# Patient Record
Sex: FEMALE | Race: BLACK OR AFRICAN AMERICAN | NOT HISPANIC OR LATINO | Employment: FULL TIME | URBAN - METROPOLITAN AREA
[De-identification: names, ages, dates, MRNs, and addresses within clinical notes are randomized per-mention and may not be internally consistent; named-entity substitution may affect disease eponyms.]

---

## 2021-06-02 ENCOUNTER — HISTORICAL (OUTPATIENT)
Dept: ADMINISTRATIVE | Facility: HOSPITAL | Age: 26
End: 2021-06-02

## 2021-06-02 LAB
ABS NEUT (OLG): 4.27 X10(3)/MCL (ref 2.1–9.2)
ALBUMIN SERPL-MCNC: 4.1 GM/DL (ref 3.5–5)
ALBUMIN/GLOB SERPL: 1.3 RATIO (ref 1.1–2)
ALP SERPL-CCNC: 61 UNIT/L (ref 40–150)
ALT SERPL-CCNC: 18 UNIT/L (ref 0–55)
APPEARANCE, UA: CLEAR
AST SERPL-CCNC: 15 UNIT/L (ref 5–34)
BACTERIA SPEC CULT: NORMAL /HPF
BASOPHILS # BLD AUTO: 0 X10(3)/MCL (ref 0–0.2)
BASOPHILS NFR BLD AUTO: 0 %
BILIRUB SERPL-MCNC: 0.5 MG/DL
BILIRUB UR QL STRIP: NEGATIVE
BILIRUBIN DIRECT+TOT PNL SERPL-MCNC: 0.2 MG/DL (ref 0–0.5)
BILIRUBIN DIRECT+TOT PNL SERPL-MCNC: 0.3 MG/DL (ref 0–0.8)
BUN SERPL-MCNC: 10.3 MG/DL (ref 7–18.7)
CALCIUM SERPL-MCNC: 9.7 MG/DL (ref 8.4–10.2)
CHLORIDE SERPL-SCNC: 103 MMOL/L (ref 98–107)
CHOLEST SERPL-MCNC: 185 MG/DL
CHOLEST/HDLC SERPL: 4 {RATIO} (ref 0–5)
CO2 SERPL-SCNC: 24 MMOL/L (ref 22–29)
COLOR UR: YELLOW
CREAT SERPL-MCNC: 0.76 MG/DL (ref 0.55–1.02)
EOSINOPHIL # BLD AUTO: 0.1 X10(3)/MCL (ref 0–0.9)
EOSINOPHIL NFR BLD AUTO: 2 %
ERYTHROCYTE [DISTWIDTH] IN BLOOD BY AUTOMATED COUNT: 13.3 % (ref 11.5–17)
EST. AVERAGE GLUCOSE BLD GHB EST-MCNC: 93.9 MG/DL
GLOBULIN SER-MCNC: 3.2 GM/DL (ref 2.4–3.5)
GLUCOSE (UA): NEGATIVE
GLUCOSE SERPL-MCNC: 106 MG/DL (ref 74–100)
HBA1C MFR BLD: 4.9 %
HCT VFR BLD AUTO: 41.2 % (ref 37–47)
HDLC SERPL-MCNC: 48 MG/DL (ref 35–60)
HGB BLD-MCNC: 12.6 GM/DL (ref 12–16)
HGB UR QL STRIP: NEGATIVE
KETONES UR QL STRIP: NEGATIVE
LDLC SERPL CALC-MCNC: 114 MG/DL (ref 50–140)
LEUKOCYTE ESTERASE UR QL STRIP: NEGATIVE
LYMPHOCYTES # BLD AUTO: 1.3 X10(3)/MCL (ref 0.6–4.6)
LYMPHOCYTES NFR BLD AUTO: 22 %
MCH RBC QN AUTO: 26.7 PG (ref 27–31)
MCHC RBC AUTO-ENTMCNC: 30.6 GM/DL (ref 33–36)
MCV RBC AUTO: 87.3 FL (ref 80–94)
MONOCYTES # BLD AUTO: 0.3 X10(3)/MCL (ref 0.1–1.3)
MONOCYTES NFR BLD AUTO: 5 %
NEUTROPHILS # BLD AUTO: 4.27 X10(3)/MCL (ref 2.1–9.2)
NEUTROPHILS NFR BLD AUTO: 71 %
NITRITE UR QL STRIP: NEGATIVE
PH UR STRIP: 6 [PH] (ref 5–9)
PLATELET # BLD AUTO: 372 X10(3)/MCL (ref 130–400)
PMV BLD AUTO: 10.7 FL (ref 9.4–12.4)
POTASSIUM SERPL-SCNC: 4.1 MMOL/L (ref 3.5–5.1)
PROT SERPL-MCNC: 7.3 GM/DL (ref 6.4–8.3)
PROT UR QL STRIP: NEGATIVE
RBC # BLD AUTO: 4.72 X10(6)/MCL (ref 4.2–5.4)
RBC #/AREA URNS HPF: NORMAL /[HPF]
SODIUM SERPL-SCNC: 137 MMOL/L (ref 136–145)
SP GR UR STRIP: >=1.04 (ref 1–1.03)
SQUAMOUS EPITHELIAL, UA: NORMAL /HPF (ref 0–4)
TRIGL SERPL-MCNC: 114 MG/DL (ref 37–140)
TSH SERPL-ACNC: 2.67 UIU/ML (ref 0.35–4.94)
UROBILINOGEN UR STRIP-ACNC: 0.2
VLDLC SERPL CALC-MCNC: 23 MG/DL
WBC # SPEC AUTO: 6 X10(3)/MCL (ref 4.5–11.5)
WBC #/AREA URNS HPF: NORMAL /[HPF]

## 2021-06-17 ENCOUNTER — HISTORICAL (OUTPATIENT)
Dept: ADMINISTRATIVE | Facility: HOSPITAL | Age: 26
End: 2021-06-17

## 2022-04-11 ENCOUNTER — HISTORICAL (OUTPATIENT)
Dept: ADMINISTRATIVE | Facility: HOSPITAL | Age: 27
End: 2022-04-11

## 2022-04-27 VITALS
BODY MASS INDEX: 40.65 KG/M2 | OXYGEN SATURATION: 97 % | WEIGHT: 244 LBS | DIASTOLIC BLOOD PRESSURE: 74 MMHG | SYSTOLIC BLOOD PRESSURE: 110 MMHG | HEIGHT: 65 IN

## 2022-05-24 ENCOUNTER — TELEPHONE (OUTPATIENT)
Dept: FAMILY MEDICINE | Facility: CLINIC | Age: 27
End: 2022-05-24

## 2022-05-24 DIAGNOSIS — Z00.00 WELLNESS EXAMINATION: Primary | ICD-10-CM

## 2022-05-24 NOTE — TELEPHONE ENCOUNTER
----- Message from Krishna Jarrett MA sent at 5/24/2022  8:58 AM CDT -----  Regarding: FW: Lab orders    ----- Message -----  From: Gabbi Gardner  Sent: 5/24/2022   8:22 AM CDT  To: Liv BLACKMON Staff  Subject: Lab orders                                       Can pt lab orders be placed? Pt [facility] 6/3 @ 8am

## 2023-04-30 ENCOUNTER — HOSPITAL ENCOUNTER (EMERGENCY)
Facility: HOSPITAL | Age: 28
Discharge: HOME | End: 2023-04-30
Attending: INTERNAL MEDICINE
Payer: COMMERCIAL

## 2023-04-30 VITALS
RESPIRATION RATE: 18 BRPM | WEIGHT: 256.75 LBS | OXYGEN SATURATION: 99 % | SYSTOLIC BLOOD PRESSURE: 132 MMHG | BODY MASS INDEX: 42.78 KG/M2 | DIASTOLIC BLOOD PRESSURE: 80 MMHG | TEMPERATURE: 99 F | HEART RATE: 86 BPM | HEIGHT: 65 IN

## 2023-04-30 DIAGNOSIS — M79.672 LEFT FOOT PAIN: ICD-10-CM

## 2023-04-30 LAB
ANION GAP SERPL CALC-SCNC: 8 MEQ/L
B-HCG UR QL: NEGATIVE
BASOPHILS # BLD AUTO: 0.01 X10(3)/MCL (ref 0–0.2)
BASOPHILS NFR BLD AUTO: 0.1 %
BUN SERPL-MCNC: 9.8 MG/DL (ref 7–18.7)
CALCIUM SERPL-MCNC: 8.9 MG/DL (ref 8.4–10.2)
CHLORIDE SERPL-SCNC: 108 MMOL/L (ref 98–107)
CO2 SERPL-SCNC: 24 MMOL/L (ref 22–29)
CREAT SERPL-MCNC: 0.95 MG/DL (ref 0.55–1.02)
CREAT/UREA NIT SERPL: 10
CTP QC/QA: YES
EOSINOPHIL # BLD AUTO: 0.15 X10(3)/MCL (ref 0–0.9)
EOSINOPHIL NFR BLD AUTO: 2.2 %
ERYTHROCYTE [DISTWIDTH] IN BLOOD BY AUTOMATED COUNT: 13.4 % (ref 11.5–17)
GFR SERPLBLD CREATININE-BSD FMLA CKD-EPI: >60 MLS/MIN/1.73/M2
GLUCOSE SERPL-MCNC: 87 MG/DL (ref 74–100)
HCT VFR BLD AUTO: 35.4 % (ref 37–47)
HGB BLD-MCNC: 10.7 G/DL (ref 12–16)
IMM GRANULOCYTES # BLD AUTO: 0.02 X10(3)/MCL (ref 0–0.04)
IMM GRANULOCYTES NFR BLD AUTO: 0.3 %
LYMPHOCYTES # BLD AUTO: 1.63 X10(3)/MCL (ref 0.6–4.6)
LYMPHOCYTES NFR BLD AUTO: 23.9 %
MCH RBC QN AUTO: 25.4 PG (ref 27–31)
MCHC RBC AUTO-ENTMCNC: 30.2 G/DL (ref 33–36)
MCV RBC AUTO: 83.9 FL (ref 80–94)
MONOCYTES # BLD AUTO: 0.37 X10(3)/MCL (ref 0.1–1.3)
MONOCYTES NFR BLD AUTO: 5.4 %
NEUTROPHILS # BLD AUTO: 4.63 X10(3)/MCL (ref 2.1–9.2)
NEUTROPHILS NFR BLD AUTO: 68.1 %
NRBC BLD AUTO-RTO: 0 %
PLATELET # BLD AUTO: 347 X10(3)/MCL (ref 130–400)
PMV BLD AUTO: 9.7 FL (ref 7.4–10.4)
POTASSIUM SERPL-SCNC: 3.7 MMOL/L (ref 3.5–5.1)
RBC # BLD AUTO: 4.22 X10(6)/MCL (ref 4.2–5.4)
SODIUM SERPL-SCNC: 140 MMOL/L (ref 136–145)
WBC # SPEC AUTO: 6.8 X10(3)/MCL (ref 4.5–11.5)

## 2023-04-30 PROCEDURE — 81025 URINE PREGNANCY TEST: CPT | Performed by: NURSE PRACTITIONER

## 2023-04-30 PROCEDURE — 85025 COMPLETE CBC W/AUTO DIFF WBC: CPT | Performed by: NURSE PRACTITIONER

## 2023-04-30 PROCEDURE — 96372 THER/PROPH/DIAG INJ SC/IM: CPT | Performed by: NURSE PRACTITIONER

## 2023-04-30 PROCEDURE — 63600175 PHARM REV CODE 636 W HCPCS: Performed by: NURSE PRACTITIONER

## 2023-04-30 PROCEDURE — 99284 EMERGENCY DEPT VISIT MOD MDM: CPT

## 2023-04-30 PROCEDURE — 80048 BASIC METABOLIC PNL TOTAL CA: CPT | Performed by: NURSE PRACTITIONER

## 2023-04-30 RX ORDER — INDOMETHACIN 25 MG/1
25 CAPSULE ORAL 2 TIMES DAILY PRN
Qty: 14 CAPSULE | Refills: 0 | Status: SHIPPED | OUTPATIENT
Start: 2023-04-30 | End: 2023-05-07

## 2023-04-30 RX ORDER — KETOROLAC TROMETHAMINE 30 MG/ML
30 INJECTION, SOLUTION INTRAMUSCULAR; INTRAVENOUS
Status: COMPLETED | OUTPATIENT
Start: 2023-04-30 | End: 2023-04-30

## 2023-04-30 RX ORDER — GABAPENTIN 300 MG/1
300 CAPSULE ORAL DAILY
Qty: 14 CAPSULE | Refills: 0 | Status: SHIPPED | OUTPATIENT
Start: 2023-04-30 | End: 2023-05-14

## 2023-04-30 RX ADMIN — KETOROLAC TROMETHAMINE 30 MG: 30 INJECTION, SOLUTION INTRAMUSCULAR; INTRAVENOUS at 04:04

## 2023-04-30 NOTE — Clinical Note
"Mavis"Silvia Oliveira was seen and treated in our emergency department on 4/30/2023.  She may return to work on 05/03/2023.       If you have any questions or concerns, please don't hesitate to call.      Buzz Hamm Jr., FNP"

## 2023-04-30 NOTE — ED PROVIDER NOTES
Encounter Date: 4/30/2023       History     Chief Complaint   Patient presents with    Leg Swelling     Pt in with reports of L foot and leg swelling and pain for approx 1 week. Denies any trauma. Noticeable swelling to calf and foot. L foot cooler than R. L pedal pulse +1. Denies any CP or SOB.     Pt is a 27 y.o. female who presents to the [facility] ED complaining of Lt foot pain which has been present for approx 1 week. Reports being seen at a local Urgent Clinic with no improvement in symptoms with prescribed medications. Denies injury to extremity, loss of sensation to area, redness to affected leg, chest pain, SOB, weakness, or dizziness.     Review of patient's allergies indicates:  No Known Allergies  Past Medical History:   Diagnosis Date    Obesity, unspecified      Past Surgical History:   Procedure Laterality Date    SPINAL FUSION       Family History   Family history unknown: Yes     Social History     Tobacco Use    Smoking status: Never    Smokeless tobacco: Never   Substance Use Topics    Alcohol use: Yes    Drug use: Never     Review of Systems   Constitutional:  Negative for chills, diaphoresis, fatigue and fever.   HENT:  Negative for facial swelling, rhinorrhea, sinus pressure, sinus pain, sore throat and trouble swallowing.    Respiratory:  Negative for cough, chest tightness, shortness of breath and wheezing.    Cardiovascular:  Negative for chest pain, palpitations and leg swelling.   Gastrointestinal:  Negative for abdominal pain, diarrhea, nausea and vomiting.   Genitourinary:  Negative for dysuria, flank pain, frequency, hematuria and urgency.   Musculoskeletal:  Positive for arthralgias and myalgias. Negative for back pain and joint swelling.   Skin:  Negative for color change and rash.   Neurological:  Negative for dizziness, syncope, weakness and light-headedness.   Hematological:  Does not bruise/bleed easily.   All other systems reviewed and are negative.    Physical Exam     Initial Vitals  [04/30/23 1611]   BP Pulse Resp Temp SpO2   130/87 90 18 98.8 °F (37.1 °C) 100 %      MAP       --         Physical Exam    Nursing note and vitals reviewed.  Constitutional: She appears well-developed and well-nourished.   HENT:   Head: Normocephalic and atraumatic.   Nose: Nose normal.   Mouth/Throat: Oropharynx is clear and moist.   Eyes: Conjunctivae and EOM are normal. Pupils are equal, round, and reactive to light.   Neck: Neck supple.   Normal range of motion.  Cardiovascular:  Normal rate, regular rhythm, normal heart sounds and intact distal pulses.           Pulmonary/Chest: Effort normal and breath sounds normal. No respiratory distress. She has no wheezes. She has no rhonchi. She has no rales. She exhibits no tenderness.   Abdominal: Abdomen is soft and flat. Bowel sounds are normal. She exhibits no distension. There is no abdominal tenderness. There is no rebound, no guarding, no tenderness at McBurney's point and negative Brennan's sign. negative psoas sign  Musculoskeletal:         General: Normal range of motion.      Cervical back: Normal range of motion and neck supple.      Left foot: Normal range of motion and normal capillary refill. Swelling and tenderness present. No crepitus. Normal pulse.        Legs:       Comments: Bilateral calves measure 45.5 cm and are without redness, tenderness, or edema. Bilateral feet are slightly cool to touch. Pt wearing flip flops. Cap refill is less than 2 seconds to exposed digits bilaterally.     Neurological: She is alert and oriented to person, place, and time. She has normal strength and normal reflexes.   Skin: Skin is warm and dry. Capillary refill takes less than 2 seconds.   Psychiatric: She has a normal mood and affect. Her speech is normal and behavior is normal. Judgment and thought content normal.       ED Course   Procedures  Labs Reviewed   BASIC METABOLIC PANEL - Abnormal; Notable for the following components:       Result Value    Chloride 108  (*)     All other components within normal limits   CBC WITH DIFFERENTIAL - Abnormal; Notable for the following components:    Hgb 10.7 (*)     Hct 35.4 (*)     MCH 25.4 (*)     MCHC 30.2 (*)     All other components within normal limits   CBC W/ AUTO DIFFERENTIAL    Narrative:     The following orders were created for panel order CBC auto differential.  Procedure                               Abnormality         Status                     ---------                               -----------         ------                     CBC with Differential[[id number]]        Abnormal            Final result                 Please view results for these tests on the individual orders.   EXTRA TUBES    Narrative:     The following orders were created for panel order EXTRA TUBES.  Procedure                               Abnormality         Status                     ---------                               -----------         ------                     Light Blue Top Hold[[id number]]                              In process                 Red Top Hold[[id number]]                                     In process                 Gold Top Hold[[id number]]                                    In process                   Please view results for these tests on the individual orders.   LIGHT BLUE TOP HOLD   RED TOP HOLD   GOLD TOP HOLD   POCT URINE PREGNANCY          Imaging Results              X-Ray Foot Complete Left (Final result)  Result time 04/30/23 17:13:23      Final result by Marco Singh MD (04/30/23 17:13:23)                   Impression:      No acute osseous abnormality identified.      Electronically signed by: Marco Singh  Date:    04/30/2023  Time:    17:13               Narrative:    EXAMINATION:  XR FOOT COMPLETE 3 VIEW LEFT    CLINICAL HISTORY:  Pain.    TECHNIQUE:  Three views    COMPARISON:  None available.    FINDINGS:  Left foot articular surfaces are unremarkable and there is no intrinsic osseous abnormality.   There is no acute fracture, dislocation or arthritic change.  Position and alignment is satisfactory.  There is unremarkable mineralization of the bones.  No soft tissue calcification.                                       Medications   ketorolac injection 30 mg (30 mg Intramuscular Given 4/30/23 1652)     Medical Decision Making:   Differential Diagnosis:   Myalgia  Fracture  Arthritis  Muscle strain  Clinical Tests:   Lab Tests: Ordered and Reviewed  Radiological Study: Ordered and Reviewed  ED Management:  5:19 PM Reassessed patient at this time. Reports condition has improved. Discussed with patient all pertinent ED information and results. Discussed diagnosis and treatment plan with patient. Follow up instructions and return to ED instruction have been given. All questions and concerns were addressed at this time. Patient voices understanding of information and instructions. Patient is comfortable with plan and discharge. Patient is stable for discharge.     Well's Criteria for DVT:  Clinical signs and symptoms for DVT:                                                               No  Collateral (nonvaricose) superficial veins present:                                            No  Entire leg swollen:                                                                                             No  Immobilization at least 3 days OR Surgery  in the previous 12 weeks:                                                                                  No  Localized tenderness along the deep venous system:                                      No  Pitting edema, confined to symptomatic leg:                                                     No     Paralysis, paresis, or recent plaster immobilization of the lower extremity:       No  Previously documented DVT:                                                                            No  Alternative diagnosis to DVT as likely or more likely:                                 "        Yes  (-2)  Calf swelling >3 cm compared to the other leg:                                                No  Active Cancer:                                                                                                  No      -2 points  Low risk group for DVT. "Unlikely" according to Wells' DVT studies.                            Clinical Impression:   Final diagnoses:  [M79.672] Left foot pain        ED Disposition Condition    Discharge Stable          ED Prescriptions       Medication Sig Dispense Start Date End Date Auth. Provider    indomethacin (INDOCIN) 25 MG capsule Take 1 capsule (25 mg total) by mouth 2 (two) times daily as needed (pain). 14 capsule 4/30/2023 5/7/2023 Buzz Hamm Jr., ALLIP    gabapentin (NEURONTIN) 300 MG capsule Take 1 capsule (300 mg total) by mouth once daily. for 14 days 14 capsule 4/30/2023 5/14/2023 MELISSA Pate Jr.          Follow-up Information       Follow up With Specialties Details Why Contact Info    Becki Peck MD Family Medicine In 3 days  [address] [facility]  [address] [place] [address]  [phone]      [facility] Emergency Medicine In 3 days As needed, If symptoms worsen [address] [facility] [address] [phone]             ALLI Pate Jr.P  04/30/23 5892    "

## 2023-05-11 ENCOUNTER — HOSPITAL ENCOUNTER (EMERGENCY)
Facility: HOSPITAL | Age: 28
Discharge: HOME | End: 2023-05-11
Attending: EMERGENCY MEDICINE
Payer: COMMERCIAL

## 2023-05-11 VITALS
RESPIRATION RATE: 16 BRPM | WEIGHT: 253.31 LBS | HEART RATE: 89 BPM | SYSTOLIC BLOOD PRESSURE: 122 MMHG | TEMPERATURE: 98 F | DIASTOLIC BLOOD PRESSURE: 77 MMHG | OXYGEN SATURATION: 98 % | BODY MASS INDEX: 42.2 KG/M2 | HEIGHT: 65 IN

## 2023-05-11 DIAGNOSIS — M79.605 LEFT LEG PAIN: Primary | ICD-10-CM

## 2023-05-11 LAB — D DIMER PPP IA.FEU-MCNC: <0.27 UG/ML FEU (ref 0–0.5)

## 2023-05-11 PROCEDURE — 85379 FIBRIN DEGRADATION QUANT: CPT | Performed by: EMERGENCY MEDICINE

## 2023-05-11 PROCEDURE — 99283 EMERGENCY DEPT VISIT LOW MDM: CPT

## 2023-05-11 RX ORDER — INDOMETHACIN 25 MG/1
25 CAPSULE ORAL
Qty: 15 CAPSULE | Refills: 0 | Status: SHIPPED | OUTPATIENT
Start: 2023-05-11

## 2023-05-12 NOTE — ED PROVIDER NOTES
ED PROVIDER NOTE  5/11/2023    CHIEF COMPLAINT:   Chief Complaint   Patient presents with    Leg Pain     Left leg pain and swelling x 2 weeks       HISTORY OF PRESENT ILLNESS:   Mavis Oliveira is a 27 y.o. female who presents with chief complaint Leg pain and swelling.  Onset was about 2 weeks ago whenever she began having some swelling in her left leg along with the pain that she describes as tightness and like she was going to get a Charley horse.  She states that the swelling and tightness has begun to progress and involve her entire left leg.  She was seen back on the 30th of last month and was given some pain medicine and muscle relaxers but she states helps with the pain but the swelling is not going away.  Denies history of DVT, numbness or weakness in extremity, abdominal pain, nausea, vomiting, chest pain, shortness of breath.    The history is provided by the patient.       REVIEW OF SYSTEMS: as noted in the HPI.  NURSING NOTES REVIEWED      PAST MEDICAL/SURGICAL HISTORY:   Past Medical History:   Diagnosis Date    Obesity, unspecified       Past Surgical History:   Procedure Laterality Date    SPINAL FUSION         FAMILY HISTORY:   Family History   Family history unknown: Yes       SOCIAL HISTORY:   Social History     Tobacco Use    Smoking status: Never    Smokeless tobacco: Never   Substance Use Topics    Alcohol use: Yes    Drug use: Never       ALLERGIES: Review of patient's allergies indicates:  No Known Allergies    PHYSICAL EXAM:  Initial Vitals [05/11/23 1918]   BP Pulse Resp Temp SpO2   (!) 143/92 98 16 98.2 °F (36.8 °C) 100 %      MAP       --         Physical Exam    Nursing note and vitals reviewed.  Constitutional: Vital signs are normal. She appears well-developed and well-nourished.   HENT:   Head: Normocephalic and atraumatic.   Mouth/Throat: Uvula is midline and mucous membranes are normal.   Eyes: EOM are normal. Pupils are equal, round, and reactive to light.   Neck: Trachea normal.  Neck supple.   Cardiovascular:  Normal rate, regular rhythm and normal pulses.           Pulmonary/Chest: Effort normal and breath sounds normal.   Abdominal: Abdomen is soft. Bowel sounds are normal. There is no rebound and no guarding.   Musculoskeletal:         General: Normal range of motion.      Cervical back: Neck supple.      Left foot: Swelling present.      Comments: Subtle nonpitting edema in left foot, otherwise I do not appreciate any swelling of the lower extremity     Neurological: She is alert and oriented to person, place, and time. GCS score is 15. GCS eye subscore is 4. GCS verbal subscore is 5. GCS motor subscore is 6.   Skin: Skin is warm and dry.   Psychiatric: She has a normal mood and affect. Her speech is normal. Thought content normal.       RESULTS:  Labs Reviewed   D DIMER, QUANTITATIVE - Normal   EXTRA TUBES    Narrative:     The following orders were created for panel order EXTRA TUBES.  Procedure                               Abnormality         Status                     ---------                               -----------         ------                     Light Green Top Hold[[id number]]                             In process                 Lavender Top Hold[[id number]]                                In process                 Gold Top Hold[[id number]]                                    In process                   Please view results for these tests on the individual orders.   LIGHT GREEN TOP HOLD   LAVENDER TOP HOLD   GOLD TOP HOLD     Imaging Results    None         PROCEDURES:  Procedures    ECG:       ED COURSE AND MEDICAL DECISION MAKING:  Medications - No data to display        Medical Decision Making  Well-appearing 27-year-old female presents with left leg swelling that she states has been going on for about 2 weeks having some cramping in her calf and tightness in her legs.  She does have some mild swelling in her foot but otherwise I do not appreciate any swelling throughout  the rest of her leg come in and she has no pitting edema.  The 2nd time she is been evaluated for this.  She is considered low risk for DVT by Wells criteria, so D-dimer was obtained which is negative.  Discussed symptomatic therapy to continue at home such as use of compression socks.  Review of medical record shows that she is had x-ray imaging of the foot on her last visit which was normal.  She reports that the Indocin seems to help with her pain so will refill this. Low suspicion for May-Thurner syndrome.  Instructed to follow up outpatient with her PCP for further evaluation but do not feel that she needs any emergent imaging.  Given strict ED return precautions. I have spoken with the patient and/or caregivers. I have explained the patient's condition, diagnoses and treatment plan based on the information available to me at this time. I have answered the patient's and/or caregiver's questions and addressed any concerns. The patient and/or caregivers have as good an understanding of the patient's diagnosis, condition and treatment plan as can be expected at this point. The vital signs have been stable. The patient's condition is stable and appropriate for discharge from the emergency department.     The patient will pursue further outpatient evaluation with the primary care physician or other designated or consulting physician as outlined in the discharge instructions. The patient and/or caregivers are agreeable to this plan of care and follow-up instructions have been explained in detail. The patient and/or caregivers have received these instructions in written format and have expressed an understanding of the discharge instructions. The patient and/or caregivers are aware that any significant change in condition or worsening of symptoms should prompt an immediate return to this or the closest emergency department or a call to 911.    Amount and/or Complexity of Data Reviewed  External Data Reviewed: labs,  radiology and notes.  Labs: ordered. Decision-making details documented in ED Course.    Risk  Prescription drug management.        CLINICAL IMPRESSION:  1. Left leg pain        DISPOSITION:   ED Disposition Condition    Discharge Stable            ED Prescriptions       Medication Sig Dispense Start Date End Date Auth. Provider    indomethacin (INDOCIN) 25 MG capsule Take 1 capsule (25 mg total) by mouth 3 (three) times daily with meals. 15 capsule 5/11/2023 -- Uzair Grajeda DO          Follow-up Information    None            Uzair Grajeda DO  05/12/23 0050

## 2023-09-17 ENCOUNTER — OFFICE VISIT (OUTPATIENT)
Dept: URGENT CARE | Facility: CLINIC | Age: 28
End: 2023-09-17
Payer: COMMERCIAL

## 2023-09-17 VITALS
BODY MASS INDEX: 40.18 KG/M2 | WEIGHT: 250 LBS | DIASTOLIC BLOOD PRESSURE: 82 MMHG | HEART RATE: 89 BPM | SYSTOLIC BLOOD PRESSURE: 112 MMHG | OXYGEN SATURATION: 98 % | TEMPERATURE: 98 F | HEIGHT: 66 IN | RESPIRATION RATE: 18 BRPM

## 2023-09-17 DIAGNOSIS — J00 NASOPHARYNGITIS: Primary | ICD-10-CM

## 2023-09-17 DIAGNOSIS — B35.4 TINEA CORPORIS: ICD-10-CM

## 2023-09-17 LAB
CTP QC/QA: YES
MOLECULAR STREP A: NEGATIVE

## 2023-09-17 PROCEDURE — 99213 OFFICE O/P EST LOW 20 MIN: CPT | Mod: 25,,, | Performed by: FAMILY MEDICINE

## 2023-09-17 PROCEDURE — 99213 PR OFFICE/OUTPT VISIT, EST, LEVL III, 20-29 MIN: ICD-10-PCS | Mod: 25,,, | Performed by: FAMILY MEDICINE

## 2023-09-17 PROCEDURE — 87651 STREP A DNA AMP PROBE: CPT | Mod: QW,,, | Performed by: FAMILY MEDICINE

## 2023-09-17 PROCEDURE — 87651 POCT STREP A MOLECULAR: ICD-10-PCS | Mod: QW,,, | Performed by: FAMILY MEDICINE

## 2023-09-17 PROCEDURE — 96372 THER/PROPH/DIAG INJ SC/IM: CPT | Mod: ,,, | Performed by: FAMILY MEDICINE

## 2023-09-17 PROCEDURE — 96372 PR INJECTION,THERAP/PROPH/DIAG2ST, IM OR SUBCUT: ICD-10-PCS | Mod: ,,, | Performed by: FAMILY MEDICINE

## 2023-09-17 RX ORDER — BENZONATATE 200 MG/1
200 CAPSULE ORAL 3 TIMES DAILY PRN
Qty: 15 CAPSULE | Refills: 0 | Status: SHIPPED | OUTPATIENT
Start: 2023-09-17 | End: 2023-09-22

## 2023-09-17 RX ORDER — PROMETHAZINE HYDROCHLORIDE AND DEXTROMETHORPHAN HYDROBROMIDE 6.25; 15 MG/5ML; MG/5ML
5 SYRUP ORAL EVERY 4 HOURS PRN
Qty: 120 ML | Refills: 0 | Status: SHIPPED | OUTPATIENT
Start: 2023-09-17 | End: 2023-09-21

## 2023-09-17 RX ORDER — CICLOPIROX OLAMINE 7.7 MG/G
CREAM TOPICAL 2 TIMES DAILY
Qty: 15 G | Refills: 0 | Status: SHIPPED | OUTPATIENT
Start: 2023-09-17 | End: 2023-10-01

## 2023-09-17 RX ORDER — DEXAMETHASONE SODIUM PHOSPHATE 100 MG/10ML
10 INJECTION INTRAMUSCULAR; INTRAVENOUS ONCE
Status: COMPLETED | OUTPATIENT
Start: 2023-09-17 | End: 2023-09-17

## 2023-09-17 RX ADMIN — DEXAMETHASONE SODIUM PHOSPHATE 10 MG: 100 INJECTION INTRAMUSCULAR; INTRAVENOUS at 10:09

## 2023-09-17 NOTE — PROGRESS NOTES
Patient ID: [id number]     Chief Complaint:  Sore throat    History of Present Illness:     Mavis Oliveira is a 28 y.o. female  who presents today for symptoms of Sore Throat ( Patient is a 28 y.o. female who presents to urgent care with complaints of sore throat, ears itching, left ear pain x2 days. Alleviating factors include cough syrup with no relief./Patient is also her for a rash on her throat that she noticed about 2 weeks ago. Used some cream with no relief. )  The itchy rash on the front of her neck has been present for 2 weeks and she has put topical fluconazole on it a few times but had to stop because it burn too much.  The topical fluconazole was prescribed to her for seborrheic keratosis on her scalp.    Pt denies experiencing any fevers, chills, nausea, vomiting, difficulty breathing, dysphagia, or neck stiffness.    Past Medical History:     ----------------------------  Obesity, unspecified     Past Surgical History:   Procedure Laterality Date    SPINAL FUSION         Review of patient's allergies indicates:  No Known Allergies    No outpatient medications have been marked as taking for the 9/17/23 encounter (Office Visit) with Christiano Cook MD.     Current Facility-Administered Medications for the 9/17/23 encounter (Office Visit) with Christiano Cook MD   Medication Dose Route Frequency Provider Last Rate Last Admin    [COMPLETED] dexAMETHasone injection 10 mg  10 mg Intramuscular Once Christiano Cook MD   10 mg at 09/17/23 1018       Social History     Socioeconomic History    Marital status: Single   Tobacco Use    Smoking status: Never    Smokeless tobacco: Never   Substance and Sexual Activity    Alcohol use: Yes    Drug use: Never    Sexual activity: Not Currently        Family History   Problem Relation Age of Onset    Hyperlipidemia Mother     Hypertension Mother     Anxiety disorder Mother         Subjective:     Review of Systems   Constitutional:  Negative for chills, fever and  malaise/fatigue.   HENT:  Positive for ear pain and sore throat. Negative for congestion, ear discharge and sinus pain.    Respiratory:  Negative for cough, sputum production, shortness of breath, wheezing and stridor.    Gastrointestinal:  Negative for abdominal pain, diarrhea, nausea and vomiting.   Genitourinary:  Negative for dysuria, frequency and urgency.   Musculoskeletal:  Negative for neck pain.   Skin:  Positive for rash.   Neurological:  Negative for headaches.       Objective:     Vitals:    09/17/23 0956   BP: 112/82   Pulse: 89   Resp: 18   Temp: 97.6 °F (36.4 °C)     Body mass index is 40.35 kg/m².    Physical Exam  Constitutional:       General: She is not in acute distress.     Appearance: Normal appearance. She is not ill-appearing or toxic-appearing.   HENT:      Head: Normocephalic and atraumatic.      Right Ear: Tympanic membrane and ear canal normal.      Left Ear: Tympanic membrane and ear canal normal.      Nose: No congestion or rhinorrhea.      Mouth/Throat:      Pharynx: Oropharynx is clear. No oropharyngeal exudate or posterior oropharyngeal erythema.   Eyes:      General:         Right eye: No discharge.         Left eye: No discharge.      Extraocular Movements: Extraocular movements intact.      Conjunctiva/sclera: Conjunctivae normal.   Cardiovascular:      Rate and Rhythm: Normal rate and regular rhythm.      Heart sounds: Normal heart sounds. No murmur heard.     No gallop.   Pulmonary:      Effort: Pulmonary effort is normal. No respiratory distress.      Breath sounds: Normal breath sounds. No stridor. No wheezing, rhonchi or rales.   Chest:      Chest wall: No tenderness.   Musculoskeletal:      Cervical back: No rigidity or tenderness.   Lymphadenopathy:      Cervical: No cervical adenopathy.   Skin:     Findings: Erythema and rash present.      Comments: 2.5 cm annular maculopapular rash on the anterior aspect of the neck.  Has a whitish color that was not present before  patient began putting topical ketoconazole on it.   Neurological:      Mental Status: She is alert and oriented to person, place, and time. Mental status is at baseline.   Psychiatric:         Mood and Affect: Mood normal.         Behavior: Behavior normal.         Assessment & Plan:       ICD-10-CM ICD-9-CM   1. Nasopharyngitis  J00 460   2. Tinea corporis  B35.4 110.5        1. Nasopharyngitis  -     POCT Strep A, Molecular  -     dexAMETHasone injection 10 mg  -     promethazine-dextromethorphan (PROMETHAZINE-DM) 6.25-15 mg/5 mL Syrp; Take 5 mLs by mouth every 4 (four) hours as needed.  Dispense: 120 mL; Refill: 0  -     benzonatate (TESSALON) 200 MG capsule; Take 1 capsule (200 mg total) by mouth 3 (three) times daily as needed for Cough.  Dispense: 15 capsule; Refill: 0    2. Tinea corporis  -     ciclopirox (LOPROX) 0.77 % Crea; Apply topically 2 (two) times daily. for 14 days  Dispense: 15 g; Refill: 0         Strep negative. We talked about symptoms, likely diagnoses and management. We discussed that pt likely has a viral upper respiratory infection that will resolve on its own within 1-2 weeks, and that only symptomatic treatment is indicated at this time.     Patient opted for a steroid shot for her general symptoms.  Pt is not diabetic, has not had a vaccine in the past 2 weeks, does not intend to get a vaccine in the next 2 weeks, does not have any immune conditions, is not taking immunosuppressant medications, and has not had steroids in the past month.    We discussed warning signs and symptoms to monitor for and to seek medical care if they emerge. Pt will return  if symptoms change, worsen, or do not resolved within the expected time range.     As for the rash, it is annular appearance and pruritic nature lens toward a tinea corporis diagnosis, but the whitish film currently over the rash makes evaluation less clear.  Because the topical fluconazole made the rash burn too much for her to continue  using, we will try topical ciclopirox for a couple of weeks to see if she can tolerate that.  If no change in rash by that time she should come in for re-evaluation.  Alternatively, if the ciclopirox cream is also too painful for her to continue using, she should return here or to primary care for consideration of oral antifungal medication.

## 2023-09-17 NOTE — LETTER
September 17, 2023      [facility]  [address], [address]  [place] [address]  Phone: [phone]  Fax: [phone]       Patient: Mavis Oliveira   YOB: 1995  Date of Visit: 09/17/2023    To Whom It May Concern:    Oralia Oliveira  was at [facility] on 09/17/2023. The patient may return to work/school on 09/19/2023 with no restrictions. If you have any questions or concerns, or if I can be of further assistance, please do not hesitate to contact me.    Sincerely,    Christiano Cook MD

## 2023-09-27 NOTE — PATIENT INSTRUCTIONS
Please use the topical ciclopirox twice daily for 2 weeks.  If rash is not considerably better at that time please follow-up.  If the cream burns too much to continue using, please come back or go to your primary care provider, as an oral medication may be needed.    Please take promethazine cough syrup and Tessalon Perles as needed for cough.  Please note that promethazine cough syrup can induce drowsiness.  Some patients prefer to take it only at night.    Drink plenty of fluids.      Get plenty of rest.      Tylenol or Motrin as needed.      Go to the ER with any significant change or worsening of symptoms.     Follow up with your primary care doctor.    38 37.2

## 2023-11-27 ENCOUNTER — CLINICAL SUPPORT (OUTPATIENT)
Dept: URGENT CARE | Facility: CLINIC | Age: 28
End: 2023-11-27
Payer: COMMERCIAL

## 2023-11-27 VITALS
BODY MASS INDEX: 41.8 KG/M2 | SYSTOLIC BLOOD PRESSURE: 123 MMHG | RESPIRATION RATE: 16 BRPM | OXYGEN SATURATION: 100 % | HEIGHT: 65 IN | WEIGHT: 250.88 LBS | DIASTOLIC BLOOD PRESSURE: 85 MMHG | HEART RATE: 90 BPM | TEMPERATURE: 99 F

## 2023-11-27 DIAGNOSIS — J02.9 SORETHROAT: ICD-10-CM

## 2023-11-27 DIAGNOSIS — R51.9 GENERALIZED HEADACHES: ICD-10-CM

## 2023-11-27 DIAGNOSIS — R52 BODY ACHES: ICD-10-CM

## 2023-11-27 DIAGNOSIS — J02.0 STREPTOCOCCAL PHARYNGITIS: Primary | ICD-10-CM

## 2023-11-27 LAB
CTP QC/QA: YES
MOLECULAR STREP A: POSITIVE
POC MOLECULAR INFLUENZA A AGN: NEGATIVE
POC MOLECULAR INFLUENZA B AGN: NEGATIVE
SARS-COV-2 RDRP RESP QL NAA+PROBE: NEGATIVE

## 2023-11-27 PROCEDURE — 87635 SARS-COV-2 COVID-19 AMP PRB: CPT | Mod: PBBFAC | Performed by: FAMILY MEDICINE

## 2023-11-27 PROCEDURE — 87502 INFLUENZA DNA AMP PROBE: CPT | Mod: PBBFAC | Performed by: FAMILY MEDICINE

## 2023-11-27 PROCEDURE — 99214 OFFICE O/P EST MOD 30 MIN: CPT | Mod: S$PBB,,, | Performed by: FAMILY MEDICINE

## 2023-11-27 PROCEDURE — 99214 PR OFFICE/OUTPT VISIT, EST, LEVL IV, 30-39 MIN: ICD-10-PCS | Mod: S$PBB,,, | Performed by: FAMILY MEDICINE

## 2023-11-27 PROCEDURE — 99214 OFFICE O/P EST MOD 30 MIN: CPT | Mod: PBBFAC | Performed by: FAMILY MEDICINE

## 2023-11-27 PROCEDURE — 87651 STREP A DNA AMP PROBE: CPT | Mod: PBBFAC | Performed by: FAMILY MEDICINE

## 2023-11-27 RX ORDER — AMOXICILLIN 875 MG/1
875 TABLET, FILM COATED ORAL EVERY 12 HOURS
Qty: 20 TABLET | Refills: 0 | Status: SHIPPED | OUTPATIENT
Start: 2023-11-27 | End: 2023-11-28 | Stop reason: CLARIF

## 2023-11-27 RX ORDER — PHENTERMINE HYDROCHLORIDE 15 MG/1
15 CAPSULE ORAL 2 TIMES DAILY
COMMUNITY

## 2023-11-27 NOTE — LETTER
November 27, 2023      [facility]  [address] [address]  Phone: [phone]       Patient: Mavis Oliveira   YOB: 1995  Date of Visit: 11/27/2023    To Whom It May Concern:    Oralia Oliveira  was at [facility] on 11/27/2023. The patient may return to work/school on NOV 30 2023 with no restrictions. If you have any questions or concerns, or if I can be of further assistance, please do not hesitate to contact me.    Sincerely,    MERA NASSAR MD

## 2023-11-28 RX ORDER — AZITHROMYCIN 250 MG/1
TABLET, FILM COATED ORAL
Qty: 6 TABLET | Refills: 0 | Status: SHIPPED | OUTPATIENT
Start: 2023-11-28 | End: 2023-12-02

## 2023-11-28 NOTE — PROGRESS NOTES
"Subjective:       Patient ID: Mavis Oliveira is a 28 y.o. female.    Vitals:  height is 5' 5" (1.651 m) and weight is 113.8 kg (250 lb 14.1 oz). Her oral temperature is 98.9 °F (37.2 °C). Her blood pressure is 123/85 and her pulse is 90. Her respiration is 16 and oxygen saturation is 100%.     Chief Complaint: Sore Throat (X3days, c/o bodyaches x3days, no fever.)    Patient with 3 days of sore throat, myalgias    Sore Throat   Associated symptoms include swollen glands. Pertinent negatives include no abdominal pain, coughing, drooling, ear pain, neck pain, shortness of breath, stridor, trouble swallowing or vomiting.         HENT:  Positive for sore throat. Negative for ear pain, drooling and trouble swallowing.    Neck: Negative for neck pain.   Respiratory:  Negative for cough, shortness of breath and stridor.    Gastrointestinal:  Negative for abdominal pain and vomiting.       Objective:   Physical Exam   Constitutional: She appears well-developed.  Non-toxic appearance. She does not appear ill. No distress.   HENT:   Head: Atraumatic.   Nose: No purulent discharge. Right sinus exhibits no maxillary sinus tenderness and no frontal sinus tenderness. Left sinus exhibits no maxillary sinus tenderness and no frontal sinus tenderness.   Mouth/Throat: Uvula is midline. No trismus in the jaw. No uvula swelling. Posterior oropharyngeal erythema present. No oropharyngeal exudate.   Eyes: Right eye exhibits no discharge. Left eye exhibits no discharge. Extraocular movement intact   Neck: Neck supple. No neck rigidity present.   Cardiovascular: Regular rhythm.   Pulmonary/Chest: Effort normal and breath sounds normal. No respiratory distress. She has no wheezes. She has no rales.   Lymphadenopathy:     She has cervical adenopathy (Right anterior cervical, nontender).   Neurological: She is alert.   Skin: Skin is warm, dry and not diaphoretic.   Psychiatric: Her behavior is normal.   Nursing note and vitals " reviewed.    Results for orders placed or performed in visit on 11/27/23   POCT Influenza A/B Molecular   Result Value Ref Range    POC Molecular Influenza A Ag Negative Negative, Not Reported    POC Molecular Influenza B Ag Negative Negative, Not Reported     Acceptable Yes    POCT Strep A, Molecular   Result Value Ref Range    Molecular Strep A, POC Positive (A) Negative     Acceptable Yes    POCT COVID-19 Rapid Screening   Result Value Ref Range    POC Rapid COVID Negative Negative     Acceptable Yes          Assessment:     1. Streptococcal pharyngitis    2. Body aches    3. Sorethroat    4. Generalized headaches          Plan:   Take medications as directed.  Discussed contagious precautions.      Please encourage fluids and use over-the-counter medications for symptoms as needed.  Monitor closely.  Please follow instructions on patient education material.  Return to urgent care in 2-3 days if symptoms are not improving. Seek care immediately if new or worsening symptoms develop.    Streptococcal pharyngitis  -     amoxicillin (AMOXIL) 875 MG tablet; Take 1 tablet (875 mg total) by mouth every 12 (twelve) hours. for 10 days  Dispense: 20 tablet; Refill: 0    Body aches  -     POCT Influenza A/B Molecular  -     POCT Strep A, Molecular  -     POCT COVID-19 Rapid Screening    Sorethroat  -     POCT Influenza A/B Molecular  -     POCT Strep A, Molecular  -     POCT COVID-19 Rapid Screening    Generalized headaches        Please note: This chart was completed via voice to text dictation. It may contain typographical/word recognition errors. If there are any questions, please contact the provider for final clarification.

## 2024-01-19 DIAGNOSIS — I51.7 RVH (RIGHT VENTRICULAR HYPERTROPHY): Primary | ICD-10-CM

## 2024-02-02 ENCOUNTER — HOSPITAL ENCOUNTER (OUTPATIENT)
Dept: CARDIOLOGY | Facility: HOSPITAL | Age: 29
Discharge: HOME | End: 2024-02-02
Attending: FAMILY MEDICINE
Payer: COMMERCIAL

## 2024-02-02 DIAGNOSIS — I51.7 RVH (RIGHT VENTRICULAR HYPERTROPHY): ICD-10-CM

## 2024-02-02 LAB
AORTIC ROOT ANNULUS: 3.2 CM
AV INDEX (PROSTH): 0.56
AV MEAN GRADIENT: 3 MMHG
AV PEAK GRADIENT: 5 MMHG
AV VALVE AREA BY VELOCITY RATIO: 1.72 CM²
AV VALVE AREA: 1.76 CM²
AV VELOCITY RATIO: 0.55
CV ECHO LV RWT: 0.37 CM
DOP CALC AO PEAK VEL: 1.15 M/S
DOP CALC AO VTI: 22.7 CM
DOP CALC LVOT AREA: 3.1 CM2
DOP CALC LVOT DIAMETER: 2 CM
DOP CALC LVOT PEAK VEL: 0.63 M/S
DOP CALC LVOT STROKE VOLUME: 39.88 CM3
DOP CALC MV VTI: 23.6 CM
DOP CALCLVOT PEAK VEL VTI: 12.7 CM
E WAVE DECELERATION TIME: 195 MSEC
E/A RATIO: 1.5
E/E' RATIO: 5.54 M/S
ECHO LV POSTERIOR WALL: 0.94 CM (ref 0.6–1.1)
FRACTIONAL SHORTENING: 25 % (ref 28–44)
HR MV ECHO: 70 BPM
INTERVENTRICULAR SEPTUM: 0.82 CM (ref 0.6–1.1)
LEFT ATRIUM SIZE: 3.1 CM
LEFT INTERNAL DIMENSION IN SYSTOLE: 3.84 CM (ref 2.1–4)
LEFT VENTRICLE DIASTOLIC VOLUME: 124 ML
LEFT VENTRICLE SYSTOLIC VOLUME: 63.5 ML
LEFT VENTRICULAR INTERNAL DIMENSION IN DIASTOLE: 5.1 CM (ref 3.5–6)
LEFT VENTRICULAR MASS: 158.83 G
LV LATERAL E/E' RATIO: 4.5 M/S
LV SEPTAL E/E' RATIO: 7.2 M/S
LVOT MG: 1 MMHG
LVOT MV: 0.4 CM/S
MV MEAN GRADIENT: 2 MMHG
MV PEAK A VEL: 0.48 M/S
MV PEAK E VEL: 0.72 M/S
MV PEAK GRADIENT: 5 MMHG
MV STENOSIS PRESSURE HALF TIME: 54 MS
MV VALVE AREA BY CONTINUITY EQUATION: 1.69 CM2
MV VALVE AREA P 1/2 METHOD: 4.07 CM2
PISA TR MAX VEL: 1.9 M/S
RA PRESSURE ESTIMATED: 3 MMHG
RV TB RVSP: 5 MMHG
TDI LATERAL: 0.16 M/S
TDI SEPTAL: 0.1 M/S
TDI: 0.13 M/S
TR MAX PG: 14 MMHG
TV REST PULMONARY ARTERY PRESSURE: 17 MMHG

## 2024-02-02 PROCEDURE — 93306 TTE W/DOPPLER COMPLETE: CPT

## 2024-04-09 ENCOUNTER — OFFICE VISIT (OUTPATIENT)
Dept: FAMILY MEDICINE | Facility: CLINIC | Age: 29
End: 2024-04-09
Payer: COMMERCIAL

## 2024-04-09 ENCOUNTER — LAB VISIT (OUTPATIENT)
Dept: LAB | Facility: HOSPITAL | Age: 29
End: 2024-04-09
Attending: FAMILY MEDICINE
Payer: COMMERCIAL

## 2024-04-09 VITALS
DIASTOLIC BLOOD PRESSURE: 81 MMHG | BODY MASS INDEX: 39.63 KG/M2 | RESPIRATION RATE: 18 BRPM | HEART RATE: 77 BPM | WEIGHT: 237.88 LBS | OXYGEN SATURATION: 97 % | SYSTOLIC BLOOD PRESSURE: 122 MMHG | TEMPERATURE: 98 F | HEIGHT: 65 IN

## 2024-04-09 DIAGNOSIS — E66.01 MORBID OBESITY: ICD-10-CM

## 2024-04-09 DIAGNOSIS — E11.9 TYPE 2 DIABETES MELLITUS WITHOUT COMPLICATION, WITHOUT LONG-TERM CURRENT USE OF INSULIN: ICD-10-CM

## 2024-04-09 DIAGNOSIS — Z12.4 CERVICAL CANCER SCREENING: ICD-10-CM

## 2024-04-09 DIAGNOSIS — D64.9 ANEMIA, UNSPECIFIED TYPE: ICD-10-CM

## 2024-04-09 DIAGNOSIS — E78.2 MIXED HYPERLIPIDEMIA: ICD-10-CM

## 2024-04-09 DIAGNOSIS — Z00.00 ENCOUNTER FOR MEDICAL EXAMINATION TO ESTABLISH CARE: Primary | ICD-10-CM

## 2024-04-09 PROBLEM — E78.5 HYPERLIPIDEMIA: Status: ACTIVE | Noted: 2024-04-09

## 2024-04-09 LAB
ALBUMIN SERPL-MCNC: 3.7 G/DL (ref 3.5–5)
ALBUMIN/GLOB SERPL: 1.1 RATIO (ref 1.1–2)
ALP SERPL-CCNC: 67 UNIT/L (ref 40–150)
ALT SERPL-CCNC: 16 UNIT/L (ref 0–55)
APPEARANCE UR: CLEAR
AST SERPL-CCNC: 12 UNIT/L (ref 5–34)
BASOPHILS # BLD AUTO: 0.02 X10(3)/MCL
BASOPHILS NFR BLD AUTO: 0.3 %
BILIRUB SERPL-MCNC: 0.5 MG/DL
BILIRUB UR QL STRIP.AUTO: NEGATIVE
BUN SERPL-MCNC: 8.1 MG/DL (ref 7–18.7)
CALCIUM SERPL-MCNC: 9.3 MG/DL (ref 8.4–10.2)
CHLORIDE SERPL-SCNC: 108 MMOL/L (ref 98–107)
CHOLEST SERPL-MCNC: 193 MG/DL
CHOLEST/HDLC SERPL: 4 {RATIO} (ref 0–5)
CO2 SERPL-SCNC: 26 MMOL/L (ref 22–29)
COLOR UR AUTO: NORMAL
CREAT SERPL-MCNC: 0.81 MG/DL (ref 0.55–1.02)
CREAT UR-MCNC: 188.8 MG/DL (ref 45–106)
EOSINOPHIL # BLD AUTO: 0.07 X10(3)/MCL (ref 0–0.9)
EOSINOPHIL NFR BLD AUTO: 1.1 %
ERYTHROCYTE [DISTWIDTH] IN BLOOD BY AUTOMATED COUNT: 13.7 % (ref 11.5–17)
EST. AVERAGE GLUCOSE BLD GHB EST-MCNC: 93.9 MG/DL
FERRITIN SERPL-MCNC: 29.75 NG/ML (ref 4.63–204)
GFR SERPLBLD CREATININE-BSD FMLA CKD-EPI: >60 MLS/MIN/1.73/M2
GLOBULIN SER-MCNC: 3.5 GM/DL (ref 2.4–3.5)
GLUCOSE SERPL-MCNC: 101 MG/DL (ref 74–100)
GLUCOSE UR QL STRIP.AUTO: NEGATIVE
HBA1C MFR BLD: 4.9 %
HCT VFR BLD AUTO: 38.5 % (ref 37–47)
HDLC SERPL-MCNC: 49 MG/DL (ref 35–60)
HGB BLD-MCNC: 12 G/DL (ref 12–16)
IMM GRANULOCYTES # BLD AUTO: 0.02 X10(3)/MCL (ref 0–0.04)
IMM GRANULOCYTES NFR BLD AUTO: 0.3 %
IRON SATN MFR SERPL: 13 % (ref 20–50)
IRON SERPL-MCNC: 38 UG/DL (ref 50–170)
KETONES UR QL STRIP.AUTO: NEGATIVE
LDLC SERPL CALC-MCNC: 128 MG/DL (ref 50–140)
LEUKOCYTE ESTERASE UR QL STRIP.AUTO: NEGATIVE
LYMPHOCYTES # BLD AUTO: 1.29 X10(3)/MCL (ref 0.6–4.6)
LYMPHOCYTES NFR BLD AUTO: 19.5 %
MCH RBC QN AUTO: 26 PG (ref 27–31)
MCHC RBC AUTO-ENTMCNC: 31.2 G/DL (ref 33–36)
MCV RBC AUTO: 83.3 FL (ref 80–94)
MICROALBUMIN UR-MCNC: 5.2 UG/ML
MICROALBUMIN/CREAT RATIO PNL UR: 2.8 MG/GM CR (ref 0–30)
MONOCYTES # BLD AUTO: 0.35 X10(3)/MCL (ref 0.1–1.3)
MONOCYTES NFR BLD AUTO: 5.3 %
NEUTROPHILS # BLD AUTO: 4.87 X10(3)/MCL (ref 2.1–9.2)
NEUTROPHILS NFR BLD AUTO: 73.5 %
NITRITE UR QL STRIP.AUTO: NEGATIVE
NRBC BLD AUTO-RTO: 0 %
PH UR STRIP.AUTO: 7 [PH]
PLATELET # BLD AUTO: 365 X10(3)/MCL (ref 130–400)
PMV BLD AUTO: 9.6 FL (ref 7.4–10.4)
POTASSIUM SERPL-SCNC: 4 MMOL/L (ref 3.5–5.1)
PROT SERPL-MCNC: 7.2 GM/DL (ref 6.4–8.3)
PROT UR QL STRIP.AUTO: NEGATIVE
RBC # BLD AUTO: 4.62 X10(6)/MCL (ref 4.2–5.4)
RBC UR QL AUTO: NEGATIVE
SODIUM SERPL-SCNC: 140 MMOL/L (ref 136–145)
SP GR UR STRIP.AUTO: 1.02 (ref 1–1.03)
TIBC SERPL-MCNC: 262 UG/DL (ref 70–310)
TIBC SERPL-MCNC: 300 UG/DL (ref 250–450)
TRANSFERRIN SERPL-MCNC: 279 MG/DL (ref 180–382)
TRIGL SERPL-MCNC: 80 MG/DL (ref 37–140)
TSH SERPL-ACNC: 1.06 UIU/ML (ref 0.35–4.94)
UROBILINOGEN UR STRIP-ACNC: 0.2
VLDLC SERPL CALC-MCNC: 16 MG/DL
WBC # SPEC AUTO: 6.62 X10(3)/MCL (ref 4.5–11.5)

## 2024-04-09 PROCEDURE — 99214 OFFICE O/P EST MOD 30 MIN: CPT | Mod: ,,, | Performed by: FAMILY MEDICINE

## 2024-04-09 PROCEDURE — 82728 ASSAY OF FERRITIN: CPT

## 2024-04-09 PROCEDURE — 36415 COLL VENOUS BLD VENIPUNCTURE: CPT

## 2024-04-09 PROCEDURE — 80053 COMPREHEN METABOLIC PANEL: CPT

## 2024-04-09 PROCEDURE — 83036 HEMOGLOBIN GLYCOSYLATED A1C: CPT

## 2024-04-09 PROCEDURE — 3079F DIAST BP 80-89 MM HG: CPT | Mod: CPTII,,, | Performed by: FAMILY MEDICINE

## 2024-04-09 PROCEDURE — 81003 URINALYSIS AUTO W/O SCOPE: CPT

## 2024-04-09 PROCEDURE — 1159F MED LIST DOCD IN RCRD: CPT | Mod: CPTII,,, | Performed by: FAMILY MEDICINE

## 2024-04-09 PROCEDURE — 3074F SYST BP LT 130 MM HG: CPT | Mod: CPTII,,, | Performed by: FAMILY MEDICINE

## 2024-04-09 PROCEDURE — 80061 LIPID PANEL: CPT

## 2024-04-09 PROCEDURE — 1160F RVW MEDS BY RX/DR IN RCRD: CPT | Mod: CPTII,,, | Performed by: FAMILY MEDICINE

## 2024-04-09 PROCEDURE — 84443 ASSAY THYROID STIM HORMONE: CPT

## 2024-04-09 PROCEDURE — 3044F HG A1C LEVEL LT 7.0%: CPT | Mod: CPTII,,, | Performed by: FAMILY MEDICINE

## 2024-04-09 PROCEDURE — 83540 ASSAY OF IRON: CPT

## 2024-04-09 PROCEDURE — 85025 COMPLETE CBC W/AUTO DIFF WBC: CPT

## 2024-04-09 PROCEDURE — 3008F BODY MASS INDEX DOCD: CPT | Mod: CPTII,,, | Performed by: FAMILY MEDICINE

## 2024-04-09 NOTE — PROGRESS NOTES
Mavis Oliveira  04/10/2024  [id number]    Subjective:      Patient ID: Mavis Oliveira is a 28 y.o. female.    Chief Complaint: Establish Care (Est. Care Reports back pain and neck. Bumps that come under breast that itch they come and go.)    Disclaimer:  This note is prepared using voice recognition software and as such is likely to have errors despite attempts at proofreading. Please contact me for questions.     28-year-old female who presents to [facility].  The patient states that she was recently diagnosed with diabetes mellitus type 2 in December 2024 after having routine labs with bariatric medicine.  She states that she was prescribed medication however never received it.  She also admits to history of hyperlipidemia, anemia and obesity.  She has a nonsmoker and denies recreational drug use.  She is s/p lumbar spinal fusion 2/2 scoliosis multiple years ago.  She also admits to previous wrist surgery.  The patient states that she has intermittent nodules and rash around her breasts and requests referral to Gynecology for updated Pap smear.  Denies any current rashes.    History:  Past Medical History:   Diagnosis Date    Hyperlipidemia     Nephrolithiasis     Obesity, unspecified     Scoliosis     T2DM (type 2 diabetes mellitus)      Past Surgical History:   Procedure Laterality Date    SPINAL FUSION      Lumbar 2/2 scoliosis    WRIST SURGERY Right      Family History   Problem Relation Age of Onset    Hyperlipidemia Mother     Hypertension Mother     Anxiety disorder Mother     Hypertension Maternal Grandmother     Breast cancer Maternal Grandmother     Alzheimer's disease Maternal Grandmother     Breast cancer Maternal Aunt     Colon cancer Maternal Uncle      Social History     Socioeconomic History    Marital status: Single   Tobacco Use    Smoking status: Never    Smokeless tobacco: Never   Substance and Sexual Activity    Alcohol use: Yes    Drug use: Never    Sexual activity: Not Currently     Patient  Active Problem List   Diagnosis    Morbid obesity    Hyperlipidemia    T2DM (type 2 diabetes mellitus)     Review of patient's allergies indicates:   Allergen Reactions    Amoxil [amoxicillin] Itching and Rash         The following were reviewed at this visit: active problem list, medication list, allergies, family history, social history, and health maintenance.    Medications:  Current Outpatient Medications on File Prior to Visit   Medication Sig Dispense Refill    ciclopirox (LOPROX) 0.77 % Crea Apply topically 2 (two) times daily. for 14 days 15 g 0    gabapentin (NEURONTIN) 300 MG capsule Take 1 capsule (300 mg total) by mouth once daily. for 14 days 14 capsule 0    indomethacin (INDOCIN) 25 MG capsule Take 1 capsule (25 mg total) by mouth 3 (three) times daily with meals. (Patient not taking: Reported on 9/17/2023) 15 capsule 0    phentermine 15 MG capsule Take 15 mg by mouth 2 (two) times daily. (Patient not taking: Reported on 4/9/2024)       No current facility-administered medications on file prior to visit.       Echo    Left Ventricle: The left ventricle is normal in size. Normal wall   thickness. Normal wall motion. There is normal systolic function with a   visually estimated ejection fraction of 55 - 60%. There is normal   diastolic function.    Right Ventricle: Normal right ventricular cavity size. Wall thickness   is normal. Right ventricle wall motion  is normal. Systolic function is   normal.    Mitral Valve: The mitral valve is structurally normal. There is trace   regurgitation.    Tricuspid Valve: The tricuspid valve is structurally normal. There is   trace regurgitation.    Pulmonic Valve: There is trace regurgitation.    Pulmonary Artery: The estimated pulmonary artery systolic pressure is   17 mmHg.    IVC/SVC: Normal venous pressure at 3 mmHg.    Pericardium: There is no pericardial effusion.      Review of Systems   Constitutional:  Negative for chills, diaphoresis and fever.   Eyes:   "Negative for blurred vision and double vision.   Respiratory:  Negative for cough and shortness of breath.    Cardiovascular:  Negative for chest pain and leg swelling.   Gastrointestinal:  Negative for abdominal pain, diarrhea, nausea and vomiting.   Skin:  Negative for rash.   Neurological:  Negative for dizziness, tremors and headaches.       Objective:     Vitals:    04/09/24 1011   BP: 122/81   BP Location: Right arm   Patient Position: Sitting   Pulse: 77   Resp: 18   Temp: 98.4 °F (36.9 °C)   TempSrc: Oral   SpO2: 97%   Weight: 107.9 kg (237 lb 14.4 oz)   Height: 5' 5" (1.651 m)     Physical Exam  Vitals reviewed.   Constitutional:       General: She is not in acute distress.     Appearance: Normal appearance. She is not ill-appearing, toxic-appearing or diaphoretic.   HENT:      Head: Normocephalic.   Eyes:      General:         Right eye: No discharge.         Left eye: No discharge.      Extraocular Movements: Extraocular movements intact.      Conjunctiva/sclera: Conjunctivae normal.   Cardiovascular:      Rate and Rhythm: Normal rate and regular rhythm.      Pulses: Normal pulses.      Heart sounds: Normal heart sounds. No murmur heard.     No friction rub. No gallop.   Pulmonary:      Effort: Pulmonary effort is normal. No respiratory distress.      Breath sounds: Normal breath sounds. No stridor. No wheezing, rhonchi or rales.   Musculoskeletal:         General: Normal range of motion.      Cervical back: Normal range of motion and neck supple. No rigidity.   Skin:     General: Skin is warm and dry.      Coloration: Skin is not pale.   Neurological:      General: No focal deficit present.      Mental Status: She is alert and oriented to person, place, and time. Mental status is at baseline.   Psychiatric:         Mood and Affect: Mood normal.         Behavior: Behavior normal.         Thought Content: Thought content normal.         Judgment: Judgment normal.         Assessment:     1. Encounter for " medical examination to establish care    2. Type 2 diabetes mellitus without complication, without long-term current use of insulin    3. Mixed hyperlipidemia    4. Morbid obesity    5. Anemia, unspecified type    6. Cervical cancer screening      Plan:   Mavis was seen today for establish care.    Diagnoses and all orders for this visit:    1. Encounter for medical examination to establish care  Recommend annual eye exam and biannual dental exams.  Limit caffeine and alcohol intake.  Well balanced diet low in sugar/complex carbohydrates and increased vegetable intake encouraged.  Moderate intensity exercise 30 min/day at least 5 days/Wk (total 150 min/Wk) recommended.    2. Type 2 diabetes mellitus without complication, without long-term current use of insulin  -     CBC Auto Differential; Future  -     Comprehensive Metabolic Panel; Future  -     Hemoglobin A1C; Future  -     Lipid Panel; Future  -     TSH; Future  -     Urinalysis, Reflex to Urine Culture; Future  -     Microalbumin/Creatinine Ratio, Urine  Questionable history of diabetes mellitus type 2.    Last documented hemoglobin A1c in this EHR 4.9.    Repeat hemoglobin A1c and labs ordered above.      3. Mixed hyperlipidemia  -     Lipid Panel; Future  Fasting lipid panel ordered and pending.    Patient be treated appropriately based on results.      4. Morbid obesity  See #1.    5. Anemia, unspecified type  -     Iron and TIBC; Future  -     Ferritin; Future    6. Cervical cancer screening  -     Ambulatory referral/consult to Obstetrics / Gynecology; Future    Follow up: Follow up in about 6 weeks (around 5/21/2024) for Wellness Visit.    After visit summary was printed and given to patient upon discharge today.  Mavis Oliveira was given education on their disease process and medications.

## 2024-04-17 ENCOUNTER — TELEPHONE (OUTPATIENT)
Dept: FAMILY MEDICINE | Facility: CLINIC | Age: 29
End: 2024-04-17
Payer: COMMERCIAL

## 2024-04-17 NOTE — TELEPHONE ENCOUNTER
----- Message from Zhane Trujillo sent at 4/17/2024 10:15 AM CDT -----  Regarding: Call back  .Type:  Patient Returning Call    Who Called:Pt  Who Left Message for Patient:  Does the patient know what this is regarding?:  Would the patient rather a call back or a response via [facility]? Call back  Best Call Back Number:[phone]  Additional Information: Pt returning call, not sure what call was in regards to.

## 2024-05-06 ENCOUNTER — TELEPHONE (OUTPATIENT)
Dept: FAMILY MEDICINE | Facility: CLINIC | Age: 29
End: 2024-05-06
Payer: COMMERCIAL

## 2024-05-06 NOTE — TELEPHONE ENCOUNTER
----- Message from Kinza Ruvalcaba LPN sent at 4/25/2024  2:35 PM CDT -----    ----- Message -----  From: Ramirez Bryan  Sent: 4/25/2024  10:13 AM CDT  To: [facility] Smith Staff    .Type:  Needs Medical Advice    Who Called: Dr. Loli Sharp re: a referral   Symptoms (please be specific):    How long has patient had these symptoms:    Pharmacy name and phone #:    Would the patient rather a call back or a response via [facility]?   Best Call Back Number: [phone]  Additional Information: She does not want to see a male doctor for this referral. Female only

## 2024-05-21 ENCOUNTER — OFFICE VISIT (OUTPATIENT)
Dept: FAMILY MEDICINE | Facility: CLINIC | Age: 29
End: 2024-05-21
Payer: COMMERCIAL

## 2024-05-21 VITALS
SYSTOLIC BLOOD PRESSURE: 117 MMHG | HEART RATE: 84 BPM | HEIGHT: 65 IN | BODY MASS INDEX: 39.92 KG/M2 | WEIGHT: 239.63 LBS | DIASTOLIC BLOOD PRESSURE: 78 MMHG | TEMPERATURE: 99 F | RESPIRATION RATE: 18 BRPM | OXYGEN SATURATION: 98 %

## 2024-05-21 DIAGNOSIS — E78.2 MIXED HYPERLIPIDEMIA: ICD-10-CM

## 2024-05-21 DIAGNOSIS — M43.26 FUSION OF LUMBAR SPINE: ICD-10-CM

## 2024-05-21 DIAGNOSIS — E11.9 TYPE 2 DIABETES MELLITUS WITHOUT COMPLICATION, WITHOUT LONG-TERM CURRENT USE OF INSULIN: ICD-10-CM

## 2024-05-21 DIAGNOSIS — E61.1 IRON DEFICIENCY: ICD-10-CM

## 2024-05-21 DIAGNOSIS — Z00.00 ENCOUNTER FOR PREVENTATIVE ADULT HEALTH CARE EXAMINATION: Primary | ICD-10-CM

## 2024-05-21 DIAGNOSIS — E66.01 MORBID OBESITY: ICD-10-CM

## 2024-05-21 DIAGNOSIS — D64.9 ANEMIA, UNSPECIFIED TYPE: ICD-10-CM

## 2024-05-21 PROCEDURE — 3074F SYST BP LT 130 MM HG: CPT | Mod: CPTII,,, | Performed by: FAMILY MEDICINE

## 2024-05-21 PROCEDURE — 3066F NEPHROPATHY DOC TX: CPT | Mod: CPTII,,, | Performed by: FAMILY MEDICINE

## 2024-05-21 PROCEDURE — 3078F DIAST BP <80 MM HG: CPT | Mod: CPTII,,, | Performed by: FAMILY MEDICINE

## 2024-05-21 PROCEDURE — 3044F HG A1C LEVEL LT 7.0%: CPT | Mod: CPTII,,, | Performed by: FAMILY MEDICINE

## 2024-05-21 PROCEDURE — 1159F MED LIST DOCD IN RCRD: CPT | Mod: CPTII,,, | Performed by: FAMILY MEDICINE

## 2024-05-21 PROCEDURE — 99395 PREV VISIT EST AGE 18-39: CPT | Mod: ,,, | Performed by: FAMILY MEDICINE

## 2024-05-21 PROCEDURE — 3008F BODY MASS INDEX DOCD: CPT | Mod: CPTII,,, | Performed by: FAMILY MEDICINE

## 2024-05-21 PROCEDURE — 1160F RVW MEDS BY RX/DR IN RCRD: CPT | Mod: CPTII,,, | Performed by: FAMILY MEDICINE

## 2024-05-21 PROCEDURE — 3061F NEG MICROALBUMINURIA REV: CPT | Mod: CPTII,,, | Performed by: FAMILY MEDICINE

## 2024-05-21 NOTE — PROGRESS NOTES
Patient ID: [id number]     Chief Complaint: Annual Exam (wellness)        HPI:   Disclaimer:  This note is prepared using voice recognition software and as such is likely to have errors despite attempts at proofreading. Please contact me for questions.     Mavis Oliveira is a 28 y.o. female here today for an annual wellness visit. No other complaints today.   The patient admits eating a well balanced diet however she only eats one full meal per day. She has been avoid fast food but states she has not been losing weight. She admits to exercising at least 3-4x per week. She also avoids caffeine.  Cervical Cancer Screening - Last pap smear 3+ years ago with Dr. Farias. Referral previously sent to Dr. Loo's office.  Breast Cancer Screening - Due at age 42. Grandmother with hx of breast cancer in her 60s.  Colon Cancer Screening - Due at age 45.  Osteoporosis Screening - Not yet due.  Eye Exam - Last eye exam  >2 years. Recommend annual exam.  Dental Exam - Last dental exam >1 year. Recommend biannual exams.  Vaccinations -   Immunization History   Administered Date(s) Administered    DTaP 01/12/1998, 03/16/1998, 09/17/1998, 10/01/2001    HPV 9-Valent 06/07/2012, 08/24/2012, 11/12/2013    Hepatitis A, Pediatric/Adolescent, 2 Dose 01/26/2015    Hepatitis B, Pediatric/Adolescent [birth year], 03/16/1998    IPV 01/12/1998, 10/01/2001    Influenza - Trivalent - PF (ADULT) 01/26/2015    MMR 09/17/1998, 10/01/2001    Meningococcal Conjugate (MCV4P) 06/07/2012, 11/12/2013    OPV 1995, 09/17/1998    Pneumococcal Conjugate - 7 Valent 10/01/2001    Tdap 06/07/2012    Varicella 09/17/1998, 06/07/2012        Past Medical History:   Diagnosis Date    Hyperlipidemia     Nephrolithiasis     Obesity, unspecified     Scoliosis     T2DM (type 2 diabetes mellitus)         Past Surgical History:   Procedure Laterality Date    SPINAL FUSION      Lumbar 2/2 scoliosis    WRIST SURGERY Right        Review of patient's  allergies indicates:   Allergen Reactions    Amoxil [amoxicillin] Itching and Rash       No outpatient medications have been marked as taking for the 5/21/24 encounter (Office Visit) with Luis Cooley MD.       Social History     Socioeconomic History    Marital status: Single   Tobacco Use    Smoking status: Never    Smokeless tobacco: Never   Substance and Sexual Activity    Alcohol use: Yes    Drug use: Never    Sexual activity: Not Currently        Family History   Problem Relation Name Age of Onset    Hyperlipidemia Mother      Hypertension Mother      Anxiety disorder Mother      Hypertension Maternal Grandmother      Breast cancer Maternal Grandmother      Alzheimer's disease Maternal Grandmother      Breast cancer Maternal Aunt      Colon cancer Maternal Uncle          Lab Results   Component Value Date    WBC 6.62 04/09/2024    HGB 12.0 04/09/2024    HCT 38.5 04/09/2024     04/09/2024    CHOL 193 04/09/2024    TRIG 80 04/09/2024    HDL 49 04/09/2024    .00 04/09/2024    ALT 16 04/09/2024    AST 12 04/09/2024     04/09/2024    K 4.0 04/09/2024    CREATININE 0.81 04/09/2024    BUN 8.1 04/09/2024    CO2 26 04/09/2024    TSH 1.060 04/09/2024    HGBA1C 4.9 04/09/2024       Echo    Left Ventricle: The left ventricle is normal in size. Normal wall   thickness. Normal wall motion. There is normal systolic function with a   visually estimated ejection fraction of 55 - 60%. There is normal   diastolic function.    Right Ventricle: Normal right ventricular cavity size. Wall thickness   is normal. Right ventricle wall motion  is normal. Systolic function is   normal.    Mitral Valve: The mitral valve is structurally normal. There is trace   regurgitation.    Tricuspid Valve: The tricuspid valve is structurally normal. There is   trace regurgitation.    Pulmonic Valve: There is trace regurgitation.    Pulmonary Artery: The estimated pulmonary artery systolic pressure is   17 mmHg.    IVC/SVC:  "Normal venous pressure at 3 mmHg.    Pericardium: There is no pericardial effusion.       Subjective:     Review of Systems:   Review of Systems   Constitutional:  Negative for chills, diaphoresis and fever.   Eyes:  Negative for blurred vision and double vision.   Respiratory:  Negative for cough and shortness of breath.    Cardiovascular:  Negative for chest pain and leg swelling.   Gastrointestinal:  Negative for abdominal pain, diarrhea, nausea and vomiting.   Skin:  Negative for rash.   Neurological:  Negative for dizziness, tremors and headaches.      See HPI for details    Objective:     /78 (BP Location: Right arm, Patient Position: Sitting)   Pulse 84   Temp 98.9 °F (37.2 °C) (Oral)   Resp 18   Ht 5' 5" (1.651 m)   Wt 108.7 kg (239 lb 9.6 oz)   LMP 04/24/2024   SpO2 98%   BMI 39.87 kg/m²     Physical Exam  Constitutional:       General: She is not in acute distress.     Appearance: She is not toxic-appearing or diaphoretic.   HENT:      Head: Normocephalic and atraumatic.      Right Ear: Tympanic membrane, ear canal and external ear normal. There is no impacted cerumen.      Left Ear: Tympanic membrane, ear canal and external ear normal. There is no impacted cerumen.      Nose: Nose normal.      Mouth/Throat:      Mouth: Mucous membranes are moist.      Pharynx: Oropharynx is clear. No oropharyngeal exudate or posterior oropharyngeal erythema.   Eyes:      General: No scleral icterus.     Extraocular Movements: Extraocular movements intact.      Conjunctiva/sclera: Conjunctivae normal.   Cardiovascular:      Rate and Rhythm: Normal rate and regular rhythm.      Heart sounds: Normal heart sounds. No murmur heard.     No friction rub. No gallop.   Pulmonary:      Effort: Pulmonary effort is normal. No respiratory distress.      Breath sounds: Normal breath sounds. No stridor. No wheezing, rhonchi or rales.   Musculoskeletal:         General: Normal range of motion.      Cervical back: Normal " range of motion and neck supple. No rigidity.   Lymphadenopathy:      Cervical: No cervical adenopathy.   Skin:     General: Skin is warm and dry.      Coloration: Skin is not pale.   Neurological:      General: No focal deficit present.      Mental Status: She is alert and oriented to person, place, and time. Mental status is at baseline.   Psychiatric:         Mood and Affect: Mood normal.         Behavior: Behavior normal.         Thought Content: Thought content normal.         Judgment: Judgment normal.         Assessment:       ICD-10-CM ICD-9-CM   1. Encounter for preventative adult health care examination  Z00.00 V70.0   2. Mixed hyperlipidemia  E78.2 272.2   3. Type 2 diabetes mellitus without complication, without long-term current use of insulin  E11.9 250.00   4. Morbid obesity  E66.01 278.01   5. Anemia, unspecified type  D64.9 285.9   6. Iron deficiency  E61.1 280.9   7. Fusion of lumbar spine  M43.26 724.9        Plan:     Health Maintenance Topics with due status: Not Due       Topic Last Completion Date    Influenza Vaccine 01/26/2015    Diabetes Urine Screening 04/09/2024    Lipid Panel 04/09/2024    Hemoglobin A1c 04/09/2024      1. Encounter for preventative adult health care examination  Recommend annual eye exam and biannual dental exams.  Limit caffeine and alcohol intake.  Well balanced diet low in sugar/complex carbohydrates and increased vegetable intake encouraged.  Moderate intensity exercise 30 min/day at least 5 days/Wk (total 150 min/Wk) recommended.  Maintain a healthy BMI which may include weight loss.  Wellness labs reviewed in clinic.  See above preventative health screening at today's visit.    2. Mixed hyperlipidemia  Total cholesterol and LDL borderline.  Encouraged low fat low cholesterol diet and exercise as discussed above.  Increase dietary fiber intake.    3. Type 2 diabetes mellitus without complication, without long-term current use of insulin  Patient's HbA1c 4.9,  concern for incorrect diagnosis previously as patient is not currently taking any hypoglycemics.  Plan to repeat HbA1c in 3 months.    4. Morbid obesity  See #1.    5. Anemia, unspecified type  Hb/Hct stable and WNL however patient has decreased iron level.  Start iron supplement as prescribed below.  Iron rich diet encouraged.    6. Iron deficiency  - ferrous gluconate (FERGON) 324 MG tablet; Take 1 tablet (324 mg total) by mouth every other day.  Dispense: 30 tablet; Refill: 2    7. Fusion of lumbar spine  Stable.  Continue exercise as tolerated.  Keep follow up with spine surgery.    Follow up in about 4 months (around 9/21/2024) for DM2 Follow Up.

## 2024-05-22 RX ORDER — FERROUS GLUCONATE 324(38)MG
324 TABLET ORAL EVERY OTHER DAY
Qty: 30 TABLET | Refills: 2 | Status: SHIPPED | OUTPATIENT
Start: 2024-05-22

## 2024-07-24 ENCOUNTER — TELEPHONE (OUTPATIENT)
Dept: FAMILY MEDICINE | Facility: CLINIC | Age: 29
End: 2024-07-24
Payer: COMMERCIAL

## 2024-07-24 DIAGNOSIS — Z12.4 CERVICAL CANCER SCREENING: Primary | ICD-10-CM

## 2024-07-24 NOTE — TELEPHONE ENCOUNTER
----- Message from Vadim Alvarez sent at 7/24/2024 10:47 AM CDT -----  .Who Called: Mavis Oliveira    Does the patient already have the specialty appointment scheduled?:no  If yes, what is the date of that appointment?:n/a  Referral to What Specialty: GYN  Reason for Referral:Annual  Does the patient want the referral with a specific physician?:no  If yes, which provider?: n/a  Is the specialist an [facility]?:n/a    Preferred Method of Contact: Phone Call  Patient's Preferred Phone Number on File: [phone]   Best Call Back Number, if different:  Additional Information: Pt is requesting referral to GYN for annual visit. Pt stated GYN she was referred to in the past is no longer practicing (went to different speciality)   SEGUN EMERSON 12 NOON, WILL CALL OTHER [address], RN  08/07/21 6700

## 2024-07-24 NOTE — TELEPHONE ENCOUNTER
I have signed for the following orders AND/OR meds.  Please call the patient and ask the patient to schedule the testing AND/OR inform about any medications that were sent.      Orders Placed This Encounter   Procedures    Ambulatory referral/consult to Obstetrics / Gynecology     Standing Status:   Future     Standing Expiration Date:   8/24/2025     Referral Priority:   Routine     Referral Type:   Consultation     Referral Reason:   Specialty Services Required     Referred to Provider:   Socorro Claudio MD     Requested Specialty:   Obstetrics and Gynecology     Number of Visits Requested:   1

## 2024-07-24 NOTE — TELEPHONE ENCOUNTER
Pt is requesting referral to GYN for annual visit. Pt stated GYN she was referred to in the past is no longer practicing (went to different speciality)

## 2024-07-31 ENCOUNTER — TELEPHONE (OUTPATIENT)
Dept: FAMILY MEDICINE | Facility: CLINIC | Age: 29
End: 2024-07-31
Payer: COMMERCIAL

## 2024-08-06 ENCOUNTER — OFFICE VISIT (OUTPATIENT)
Dept: FAMILY MEDICINE | Facility: CLINIC | Age: 29
End: 2024-08-06
Payer: COMMERCIAL

## 2024-08-06 VITALS
OXYGEN SATURATION: 98 % | RESPIRATION RATE: 16 BRPM | HEART RATE: 85 BPM | BODY MASS INDEX: 39.82 KG/M2 | HEIGHT: 65 IN | WEIGHT: 239 LBS | DIASTOLIC BLOOD PRESSURE: 72 MMHG | SYSTOLIC BLOOD PRESSURE: 120 MMHG

## 2024-08-06 DIAGNOSIS — Z23 NEED FOR DIPHTHERIA-TETANUS-PERTUSSIS (TDAP) VACCINE: ICD-10-CM

## 2024-08-06 DIAGNOSIS — N62 LARGE BREASTS: ICD-10-CM

## 2024-08-06 DIAGNOSIS — Z12.83 SKIN EXAM, SCREENING FOR CANCER: ICD-10-CM

## 2024-08-06 DIAGNOSIS — E66.09 CLASS 2 OBESITY DUE TO EXCESS CALORIES WITHOUT SERIOUS COMORBIDITY WITH BODY MASS INDEX (BMI) OF 39.0 TO 39.9 IN ADULT: ICD-10-CM

## 2024-08-06 DIAGNOSIS — N92.1 MENORRHAGIA WITH IRREGULAR CYCLE: ICD-10-CM

## 2024-08-06 DIAGNOSIS — Z80.3 FAMILY HISTORY OF BREAST CANCER IN FEMALE: ICD-10-CM

## 2024-08-06 DIAGNOSIS — E61.1 IRON DEFICIENCY: Primary | ICD-10-CM

## 2024-08-06 DIAGNOSIS — N64.4 BREAST PAIN IN FEMALE: ICD-10-CM

## 2024-08-06 DIAGNOSIS — Z12.4 CERVICAL CANCER SCREENING: ICD-10-CM

## 2024-08-06 PROBLEM — E11.9 T2DM (TYPE 2 DIABETES MELLITUS): Status: RESOLVED | Noted: 2024-04-09 | Resolved: 2024-08-06

## 2024-08-06 PROCEDURE — 3044F HG A1C LEVEL LT 7.0%: CPT | Mod: CPTII,,, | Performed by: FAMILY MEDICINE

## 2024-08-06 PROCEDURE — 1160F RVW MEDS BY RX/DR IN RCRD: CPT | Mod: CPTII,,, | Performed by: FAMILY MEDICINE

## 2024-08-06 PROCEDURE — 3074F SYST BP LT 130 MM HG: CPT | Mod: CPTII,,, | Performed by: FAMILY MEDICINE

## 2024-08-06 PROCEDURE — 1159F MED LIST DOCD IN RCRD: CPT | Mod: CPTII,,, | Performed by: FAMILY MEDICINE

## 2024-08-06 PROCEDURE — 3078F DIAST BP <80 MM HG: CPT | Mod: CPTII,,, | Performed by: FAMILY MEDICINE

## 2024-08-06 PROCEDURE — 3008F BODY MASS INDEX DOCD: CPT | Mod: CPTII,,, | Performed by: FAMILY MEDICINE

## 2024-08-06 PROCEDURE — 3061F NEG MICROALBUMINURIA REV: CPT | Mod: CPTII,,, | Performed by: FAMILY MEDICINE

## 2024-08-06 PROCEDURE — 3066F NEPHROPATHY DOC TX: CPT | Mod: CPTII,,, | Performed by: FAMILY MEDICINE

## 2024-08-06 PROCEDURE — 99214 OFFICE O/P EST MOD 30 MIN: CPT | Mod: ,,, | Performed by: FAMILY MEDICINE

## 2024-08-06 RX ORDER — SEMAGLUTIDE 0.25 MG/.5ML
0.25 INJECTION, SOLUTION SUBCUTANEOUS
Qty: 2 ML | Refills: 1 | Status: SHIPPED | OUTPATIENT
Start: 2024-08-06 | End: 2024-10-05

## 2024-08-09 ENCOUNTER — HOSPITAL ENCOUNTER (OUTPATIENT)
Dept: RADIOLOGY | Facility: HOSPITAL | Age: 29
Discharge: HOME | End: 2024-08-09
Attending: FAMILY MEDICINE
Payer: COMMERCIAL

## 2024-08-09 DIAGNOSIS — N92.1 MENORRHAGIA WITH IRREGULAR CYCLE: ICD-10-CM

## 2024-08-09 PROCEDURE — 76830 TRANSVAGINAL US NON-OB: CPT | Mod: TC

## 2024-08-12 ENCOUNTER — TELEPHONE (OUTPATIENT)
Dept: FAMILY MEDICINE | Facility: CLINIC | Age: 29
End: 2024-08-12
Payer: COMMERCIAL

## 2024-08-12 DIAGNOSIS — N83.202 LEFT OVARIAN CYST: Primary | ICD-10-CM

## 2024-08-12 NOTE — TELEPHONE ENCOUNTER
----- Message from Becki Peck MD sent at 8/12/2024  9:18 AM CDT -----  Ultrasound pelvis shows a prominent endometrial stripe, most likely due to the phase of her menstrual cycle. She also has a benign appearing left ovarian cyst that measures 3.6 cm, patient has a referral in for to a GYN, please followup on appointment status and forward results to their office for review/treatment recommendations. Remaining ultrasound pelvis is normal.

## 2024-08-29 ENCOUNTER — PATIENT MESSAGE (OUTPATIENT)
Dept: ADMINISTRATIVE | Facility: HOSPITAL | Age: 29
End: 2024-08-29
Payer: COMMERCIAL

## 2024-09-04 ENCOUNTER — PATIENT OUTREACH (OUTPATIENT)
Facility: CLINIC | Age: 29
End: 2024-09-04
Payer: COMMERCIAL

## 2024-09-04 NOTE — PROGRESS NOTES
Health Maintenance Topic(s) Outreach Outcomes & Actions Taken:    Cervical Cancer Screening - Outreach Outcomes & Actions Taken  : referral sent to Dr Paty Moctezuma       Additional Notes:  Patient reply to [employer] message concerning pap smear: referral sent to Dr. Paty Moctezuma on 8/6/24

## 2024-09-24 ENCOUNTER — HOSPITAL ENCOUNTER (OUTPATIENT)
Dept: RADIOLOGY | Facility: HOSPITAL | Age: 29
Discharge: HOME | End: 2024-09-24
Attending: FAMILY MEDICINE
Payer: COMMERCIAL

## 2024-09-24 DIAGNOSIS — Z80.3 FAMILY HISTORY OF BREAST CANCER IN FEMALE: ICD-10-CM

## 2024-09-24 DIAGNOSIS — N64.4 BREAST PAIN IN FEMALE: ICD-10-CM

## 2024-09-24 PROCEDURE — 76641 ULTRASOUND BREAST COMPLETE: CPT | Mod: 26,,, | Performed by: RADIOLOGY

## 2024-09-24 PROCEDURE — 76641 ULTRASOUND BREAST COMPLETE: CPT | Mod: TC,50

## 2024-09-25 ENCOUNTER — OFFICE VISIT (OUTPATIENT)
Dept: FAMILY MEDICINE | Facility: CLINIC | Age: 29
End: 2024-09-25
Payer: COMMERCIAL

## 2024-09-25 VITALS
RESPIRATION RATE: 16 BRPM | BODY MASS INDEX: 39.49 KG/M2 | WEIGHT: 237 LBS | HEART RATE: 91 BPM | SYSTOLIC BLOOD PRESSURE: 119 MMHG | OXYGEN SATURATION: 98 % | DIASTOLIC BLOOD PRESSURE: 80 MMHG | HEIGHT: 65 IN

## 2024-09-25 DIAGNOSIS — E66.09 CLASS 2 OBESITY DUE TO EXCESS CALORIES WITHOUT SERIOUS COMORBIDITY WITH BODY MASS INDEX (BMI) OF 39.0 TO 39.9 IN ADULT: ICD-10-CM

## 2024-09-25 DIAGNOSIS — N60.82 SEBACEOUS CYST OF SKIN OF BOTH BREASTS: Primary | ICD-10-CM

## 2024-09-25 DIAGNOSIS — E61.1 IRON DEFICIENCY: ICD-10-CM

## 2024-09-25 DIAGNOSIS — Z91.89 AT HIGH RISK FOR BREAST CANCER: ICD-10-CM

## 2024-09-25 DIAGNOSIS — N60.81 SEBACEOUS CYST OF SKIN OF BOTH BREASTS: Primary | ICD-10-CM

## 2024-09-25 LAB
BASOPHILS # BLD AUTO: 0.04 X10(3)/MCL
BASOPHILS NFR BLD AUTO: 0.5 %
EOSINOPHIL # BLD AUTO: 0.13 X10(3)/MCL (ref 0–0.9)
EOSINOPHIL NFR BLD AUTO: 1.7 %
ERYTHROCYTE [DISTWIDTH] IN BLOOD BY AUTOMATED COUNT: 13.2 % (ref 11.5–17)
HCT VFR BLD AUTO: 38.6 % (ref 37–47)
HGB BLD-MCNC: 12 G/DL (ref 12–16)
IMM GRANULOCYTES # BLD AUTO: 0.02 X10(3)/MCL (ref 0–0.04)
IMM GRANULOCYTES NFR BLD AUTO: 0.3 %
IRON SATN MFR SERPL: 11 % (ref 20–50)
IRON SERPL-MCNC: 32 UG/DL (ref 50–170)
LYMPHOCYTES # BLD AUTO: 1.66 X10(3)/MCL (ref 0.6–4.6)
LYMPHOCYTES NFR BLD AUTO: 22.3 %
MCH RBC QN AUTO: 27 PG (ref 27–31)
MCHC RBC AUTO-ENTMCNC: 31.1 G/DL (ref 33–36)
MCV RBC AUTO: 86.9 FL (ref 80–94)
MONOCYTES # BLD AUTO: 0.48 X10(3)/MCL (ref 0.1–1.3)
MONOCYTES NFR BLD AUTO: 6.5 %
NEUTROPHILS # BLD AUTO: 5.11 X10(3)/MCL (ref 2.1–9.2)
NEUTROPHILS NFR BLD AUTO: 68.7 %
NRBC BLD AUTO-RTO: 0 %
PLATELET # BLD AUTO: 392 X10(3)/MCL (ref 130–400)
PMV BLD AUTO: 10 FL (ref 7.4–10.4)
RBC # BLD AUTO: 4.44 X10(6)/MCL (ref 4.2–5.4)
TIBC SERPL-MCNC: 250 UG/DL (ref 70–310)
TIBC SERPL-MCNC: 282 UG/DL (ref 250–450)
TRANSFERRIN SERPL-MCNC: 262 MG/DL (ref 180–382)
WBC # BLD AUTO: 7.44 X10(3)/MCL (ref 4.5–11.5)

## 2024-09-25 PROCEDURE — 1160F RVW MEDS BY RX/DR IN RCRD: CPT | Mod: CPTII,,, | Performed by: FAMILY MEDICINE

## 2024-09-25 PROCEDURE — 3079F DIAST BP 80-89 MM HG: CPT | Mod: CPTII,,, | Performed by: FAMILY MEDICINE

## 2024-09-25 PROCEDURE — 85025 COMPLETE CBC W/AUTO DIFF WBC: CPT | Performed by: FAMILY MEDICINE

## 2024-09-25 PROCEDURE — 3061F NEG MICROALBUMINURIA REV: CPT | Mod: CPTII,,, | Performed by: FAMILY MEDICINE

## 2024-09-25 PROCEDURE — 3066F NEPHROPATHY DOC TX: CPT | Mod: CPTII,,, | Performed by: FAMILY MEDICINE

## 2024-09-25 PROCEDURE — 83550 IRON BINDING TEST: CPT | Performed by: FAMILY MEDICINE

## 2024-09-25 PROCEDURE — 99214 OFFICE O/P EST MOD 30 MIN: CPT | Mod: ,,, | Performed by: FAMILY MEDICINE

## 2024-09-25 PROCEDURE — 83540 ASSAY OF IRON: CPT | Performed by: FAMILY MEDICINE

## 2024-09-25 PROCEDURE — 3008F BODY MASS INDEX DOCD: CPT | Mod: CPTII,,, | Performed by: FAMILY MEDICINE

## 2024-09-25 PROCEDURE — 3074F SYST BP LT 130 MM HG: CPT | Mod: CPTII,,, | Performed by: FAMILY MEDICINE

## 2024-09-25 PROCEDURE — 3044F HG A1C LEVEL LT 7.0%: CPT | Mod: CPTII,,, | Performed by: FAMILY MEDICINE

## 2024-09-25 PROCEDURE — 1159F MED LIST DOCD IN RCRD: CPT | Mod: CPTII,,, | Performed by: FAMILY MEDICINE

## 2024-09-25 RX ORDER — ONDANSETRON 4 MG/1
4 TABLET, ORALLY DISINTEGRATING ORAL EVERY 8 HOURS PRN
Qty: 10 TABLET | Refills: 0 | Status: SHIPPED | OUTPATIENT
Start: 2024-09-25

## 2024-09-25 NOTE — PROGRESS NOTES
Patient ID: [id number]     Chief Complaint: Obesity        HPI:     Mavis Oliveira is a 29 y.o. female here today for a follow up obesity.   - She has a history of iron deficiency anemia, stable with oral iron, she takes it every other day, she does report that her cycle lasts 7 days and she has been having menstrual cycle twice a month, she has an appointment with her GYN scheduled for 09/26/2024 for evaluation/treatment/pap smear. She denies active bleeding or fatigue. She is due for repeat labs, orders are in file.   - She complains of cysts in her breasts and breast pain, she has a family history of breast cancer in her maternal grandmother and maternal aunt, she wears a 46 H bra, has shoulder grooving from her bras, and upper pain pain from her bras, she is open to exploring breast reduction. She had US breast done on 09/25/2024, here to discuss the results today.   - She is obese, working on losing weight on her own, she would like trial of Wegovy to help with weight loss, but her insurance does not cover that Rx, she is open to trying Contrave, she denies family history of MEN II or medullary thyroid cancer or personal history of pancreatitis, she is not trying to get pregnant at this time. She is not interested in weight loss surgery. She denies snoring or excessive daytime sleepiness.  - Patient is without any other complaints today.          -------------------------------------    Hyperlipidemia    Nephrolithiasis    Obesity, unspecified    Scoliosis    T2DM (type 2 diabetes mellitus)        Past Surgical History:   Procedure Laterality Date    SPINAL FUSION      Lumbar 2/2 scoliosis    WRIST SURGERY Right        Review of patient's allergies indicates:   Allergen Reactions    Amoxil [amoxicillin] Itching and Rash       Outpatient Medications Marked as Taking for the 9/25/24 encounter (Office Visit) with Becki Peck MD   Medication Sig Dispense Refill    ferrous gluconate (FERGON) 324 MG  tablet Take 1 tablet (324 mg total) by mouth every other day. 30 tablet 2       Social History     Socioeconomic History    Marital status: Single   Tobacco Use    Smoking status: Never    Smokeless tobacco: Never   Substance and Sexual Activity    Alcohol use: Yes    Drug use: Never    Sexual activity: Not Currently        Family History   Problem Relation Name Age of Onset    Hyperlipidemia Mother      Hypertension Mother      Anxiety disorder Mother      Hypertension Maternal Grandmother      Breast cancer Maternal Grandmother      Alzheimer's disease Maternal Grandmother      Breast cancer Maternal Aunt      Colon cancer Maternal Uncle          Subjective:       Review of Systems:    See HPI for details    Constitutional: Denies Change in appetite. Denies Chills. Denies Fever. Denies Night sweats.  Eye: Denies Blurred vision. Denies Discharge. Denies Eye pain.  ENT: Denies Decreased hearing. Denies Sore throat. Denies Swollen glands.  Respiratory: Denies Cough. Denies Shortness of breath. Denies Shortness of breath with exertion. Denies Wheezing.  Cardiovascular: Denies Chest pain at rest. Denies Chest pain with exertion. Denies Irregular heartbeat. Denies Palpitations.  Gastrointestinal: Denies Abdominal pain. Denies Diarrhea. Denies Nausea. Denies Vomiting. Denies Hematemesis or Hematochezia.  Genitourinary: Denies Dysuria. Denies Urinary frequency. Denies Urinary urgency. Denies Blood in urine.  Endocrine: Denies Cold intolerance. Denies Excessive thirst. Denies Heat intolerance. Denies Weight loss. Denies Weight gain.  Musculoskeletal: Denies Painful joints. Denies Weakness.  Integumentary: Denies Rash. Denies Itching. Denies Dry skin.  Neurologic: Denies Dizziness. Denies Fainting. Denies Headache.  Psychiatric: Denies Depression. Denies Anxiety. Denies Suicidal/Homicidal ideations.    All Other ROS: Negative except as stated in HPI.       Objective:     /80 (BP Location: Right arm, Patient Position:  "Sitting, BP Method: Large (Automatic))   Pulse 91   Resp 16   Ht 5' 5" (1.651 m)   Wt 107.5 kg (237 lb)   LMP 09/18/2024   SpO2 98%   BMI 39.44 kg/m²     Physical Exam    General: Alert and oriented, No acute distress. Obese.   Head: Normocephalic, Atraumatic.  Eye: Pupils are equal, round and reactive to light, Extraocular movements are intact, Sclera non-icteric.  Ears/Nose/Throat: Normal, Mucosa moist,Clear.  Neck/Thyroid: Supple, Non-tender, No carotid bruit, No palpable thyromegaly or thyroid nodule, No lymphadenopathy, No JVD, Full range of motion.  Respiratory: Clear to auscultation bilaterally; No wheezes, rales or rhonchi,Non-labored respirations, Symmetrical chest wall expansion.  Cardiovascular: Regular rate and rhythm, S1/S2 normal, No murmurs, rubs or gallops.  Gastrointestinal: Soft, Non-tender, Non-distended, Normal bowel sounds, No palpable organomegaly.  Musculoskeletal: Normal range of motion.  Integumentary: Warm, Dry, Intact, No suspicious lesions or rashes.  Extremities: No clubbing, cyanosis or edema  Neurologic: No focal deficits, Cranial Nerves II-XII are grossly intact, Motor strength normal upper and lower extremities, Sensory exam intact.  Psychiatric: Normal interaction, Coherent speech, Euthymic mood, Appropriate affect     *Breast imaging results from 09/25/2024 were reviewed and discussed with patient and patient voices understanding.*      Assessment:       ICD-10-CM ICD-9-CM   1. Sebaceous cyst of skin of both breasts  N60.81 610.8    N60.82    2. At high risk for breast cancer  Z91.89 V49.89   3. Iron deficiency  E61.1 280.9   4. Class 2 obesity due to excess calories without serious comorbidity with body mass index (BMI) of 39.0 to 39.9 in adult  E66.09 278.00    Z68.39 V85.39        Plan:     Problem List Items Addressed This Visit          Renal/    At high risk for breast cancer    Relevant Orders    Ambulatory referral/consult to Breast Surgery    Sebaceous cyst of " skin of both breasts - Primary    Relevant Orders    Ambulatory referral/consult to Breast Surgery       Oncology    Iron deficiency     Other Visit Diagnoses       Class 2 obesity due to excess calories without serious comorbidity with body mass index (BMI) of 39.0 to 39.9 in adult        Relevant Medications    naltrexone-bupropion (CONTRAVE) 8-90 mg TbSR    ondansetron (ZOFRAN-ODT) 4 MG TbDL    Other Relevant Orders    [facility] E-Visit         1. Sebaceous cyst of skin of both breasts  - Ambulatory referral/consult to Breast Surgery; Future    2. At high risk for breast cancer  - Ambulatory referral/consult to Breast Surgery; Future    3. Iron deficiency  - Continue iron rich diet and oral iron as prescribed. Recheck CBC and iron panel today, will treat pending results. Notify M.D. or ER if symptoms persist or worsen, active bleeding, temp >100.4, or any acute illness.      4. Class 2 obesity due to excess calories without serious comorbidity with body mass index (BMI) of 39.0 to 39.9 in adult  - Rx trial of naltrexone-bupropion (CONTRAVE) 8-90 mg TbSR; Take 1 tablet by mouth once daily.  Dispense: 30 tablet; Refill: 2  - Rx trial of ondansetron (ZOFRAN-ODT) 4 MG TbDL; Take 1 tablet (4 mg total) by mouth every 8 (eight) hours as needed (nausea/vomiting) with Contrave.  Dispense: 10 tablet; Refill: 0  - [facility] E-Visit in 4 weeks for followup obesity.   - Diet, exercise, and 10% weight loss encouraged.  Notify M.D. or ER if temp greater than 100.4, adverse Rx side effects, or any acute illness.   Body mass index is 39.44 kg/m².  Goal BMI <30.  Exercise 5 times a week for 30 minutes per day.  Avoid soda, simple sugars, excessive rice, potatoes or bread. Limit fast foods and fried foods.  Choose complex carbs in moderation (example: green vegetables, beans, oatmeal). Eat plenty of fresh fruits and vegetables with lean meats daily.  Do not skip meals. Eat a balanced portion size.  Avoid fad diets. Consider permanent healthy  life style changes.          Mavis was seen today for obesity.    Diagnoses and all orders for this visit:    Sebaceous cyst of skin of both breasts  -     Ambulatory referral/consult to Breast Surgery; Future    At high risk for breast cancer  -     Ambulatory referral/consult to Breast Surgery; Future    Iron deficiency    Class 2 obesity due to excess calories without serious comorbidity with body mass index (BMI) of 39.0 to 39.9 in adult  -     naltrexone-bupropion (CONTRAVE) 8-90 mg TbSR; Take 1 tablet by mouth once daily.  -     ondansetron (ZOFRAN-ODT) 4 MG TbDL; Take 1 tablet (4 mg total) by mouth every 8 (eight) hours as needed (nausea/vomiting).  -     [facility] E-Visit          Medication List with Changes/Refills   New Medications    NALTREXONE-BUPROPION (CONTRAVE) 8-90 MG TBSR    Take 1 tablet by mouth once daily.       Start Date: 9/25/2024 End Date: 12/24/2024    ONDANSETRON (ZOFRAN-ODT) 4 MG TBDL    Take 1 tablet (4 mg total) by mouth every 8 (eight) hours as needed (nausea/vomiting).       Start Date: 9/25/2024 End Date: --   Current Medications    CICLOPIROX (LOPROX) 0.77 % CREA    Apply topically 2 (two) times daily. for 14 days       Start Date: 9/17/2023 End Date: 10/1/2023    FERROUS GLUCONATE (FERGON) 324 MG TABLET    Take 1 tablet (324 mg total) by mouth every other day.       Start Date: 5/22/2024 End Date: --    GABAPENTIN (NEURONTIN) 300 MG CAPSULE    Take 1 capsule (300 mg total) by mouth once daily. for 14 days       Start Date: 4/30/2023 End Date: 5/14/2023   Discontinued Medications    SEMAGLUTIDE, WEIGHT LOSS, (WEGOVY) 0.25 MG/0.5 ML PNIJ    Inject 0.25 mg into the skin every 7 days.       Start Date: 8/6/2024  End Date: 9/25/2024          Follow up in about 4 weeks (around 10/23/2024) for Obesity Followup, [facility].

## 2024-09-25 NOTE — PATIENT INSTRUCTIONS
Chauncey Gamble,     If you are due for any health screening(s) below please notify me so we can arrange them to be ordered and scheduled. Most healthy patients at your age complete them, but you are free to accept or refuse.     If you can't do it, I'll definitely understand. If you can, I'd certainly appreciate it!    Tests to Keep You Healthy    Cervical Cancer Screening: DUE  Last HbA1c < 8 (04/09/2024): Yes      Your cervical cancer screening is due     Our records indicate that you may be overdue for your screening Pap smear. A Pap smear is an important health screening that can detect abnormal cells that can become cervical cancer. Cervical cancer screenings allow for early diagnosis and increase the likelihood of successful treatment.     The current recommendation for Pap smear screening is every 3-5 years for women at average risk. We encourage you to schedule your appointment with your womens health provider. Many women see a gynecologist for this screening, but some primary care providers also provide Pap screening.     If you recently had your Pap smear screening performed outside of [facility], please let your health care team know so that they can update your health record.

## 2024-09-26 ENCOUNTER — TELEPHONE (OUTPATIENT)
Dept: FAMILY MEDICINE | Facility: CLINIC | Age: 29
End: 2024-09-26
Payer: COMMERCIAL

## 2024-09-26 DIAGNOSIS — E61.1 IRON DEFICIENCY: Primary | ICD-10-CM

## 2024-09-26 LAB — PAP RECOMMENDATION EXT: NORMAL

## 2024-09-26 RX ORDER — FERROUS GLUCONATE 324(38)MG
324 TABLET ORAL 2 TIMES DAILY WITH MEALS
Qty: 60 TABLET | Refills: 1 | Status: SHIPPED | OUTPATIENT
Start: 2024-09-26 | End: 2024-11-25

## 2024-09-26 NOTE — TELEPHONE ENCOUNTER
----- Message from Becki Peck MD sent at 9/26/2024 12:58 PM CDT -----  Her iron level is low, 32, normal: , but she is not anemic. She needs to follow an iron rich diet and I sent a prescription for oral iron to take twice daily. Iron may cause constipation, she needs to increase fiber and fluid intake while taking oral iron, she can also take an over the stool softener like Colace or Miralax as directed in the event that she has constipation, order to repeat CBC and iron level in 4-6 weeks is in file.

## 2024-09-26 NOTE — TELEPHONE ENCOUNTER
----- Message from Leif Mosquera sent at 9/26/2024 12:10 PM CDT -----  Who Called: Dr. Moctezuma's Office    Caller is requesting assistance/information from provider's office.    Symptoms (please be specific):    How long has patient had these symptoms:    List of preferred pharmacies on file (remove unneeded): [unfilled]  If different, enter pharmacy into here including location and phone number:       Preferred Method of Contact: Phone Call  Patient's Preferred Phone Number on File: Phone [phone]  Fax [phone]  Best Call Back Number, if different:  Additional Information: Are requesting ultrasound be faxed over to office, pt is currently there for appt.

## 2024-10-15 NOTE — PROGRESS NOTES
"[facility] Breast Surg  Breast Surgical Oncology  New Patient Office Visit - H&P      Referring Provider: Dr. Becki Peck  PCP: Becki Peck MD   Care Team:  OBGYN: No data on file.    Chief Complaint:   Chief Complaint   Patient presents with    Breast Mass     Patient reports cyst in bilateral breast x a few months, intermittent stabbing pain in left breast, intermittent bilateral nipple throbbing, denies swelling, nipple discharge, skin discoloration        Subjective:     HPI:  Mavis Oliveira is a 29 y.o. female who presents on 10/16/2024 for evaluation of risk for breast cancer. Based on the Tyrer-Cuzick Breast Cancer Risk Model, her lifetime risk is calculated to be 25.9%.    Patient is doing well today. She does complain of bilateral intermittent nipple pain that has been going on for a couple of months. She describes this pain as a throbbing pain. She states the pain is worse when she takes her bra off in around the time of her menstrual cycles. She does not drink excessive caffeine. She does wear bras with underwire. She also complains of bilateral intermittent "cysts" that appear in her inframammary folds. Patient describes these cysts/lumps as painful. She has never had to have any of these cysts previously drained.  She has not experienced these cysts anywhere else in her body such as in her axilla or groin.  She has never been previously diagnosed with hidradenitis suppurativa. She currently denies any other breast issues including rashes, redness, swelling, nipple discharge, or new lumps/masses. She did recently undergo bilateral breast ultrasound in September for further evaluation of breast pain in bilateral cysts in inframammary folds.  Ultrasound revealed no evidence of malignancy in either breast, however, there were inflamed sebaceous cyst noted in bilateral inframammary folds. There was no imaging correlate for bilateral intermittent nipple " pain.  Patient has not undergone any other breast imaging previously.  Patient has not previously undergone any breast biopsies or breast surgeries.  She has not undergone any genetic testing.  Patient states she lives relatively healthy lifestyle. However, she is motivated to starting eating healthier. She states she has been walking couple of times per week for exercise. She does not smoke and she drinks alcohol on occasion. She currently works as a dispatcher for [employer].     Of note, patient does have a significant family history of breast cancer.  Her maternal grandmother was diagnosed with breast cancer at the age of 70.  Patient's maternal grandmother was genetically tested and was positive for BRCA 1 mutation.  Patient has a maternal aunt who was diagnosed with breast cancer at the age of 48.  Her maternal aunt was also positive for BRCA 1 gene mutation.  She has a maternal uncle who was diagnosed with throat and prostate cancer at unknown age.  She has 2 maternal great aunts who were diagnosed with breast cancer at unknown age.  She also has a maternal great grandmother who was diagnosed with breast cancer at unknown age.    [id number]  Breast US -  No sonographic evidence of malignancy in either breast. Left breast 4:00 9 cm from the nipple intradermal 1.8 cm inflamed sebaceous cyst corresponds to the area of current clinical concern. Right breast 7:00 4 cm from the nipple intradermal 1 cm sebaceous cyst corresponds to the area of previous clinical concern. No sonographic correlate for the patient's bilateral nipple throbbing pain. BI-RADS: 2 Benign     Pathology:  none     OB/GYN History:  Age at Menarche Onset: 13  Menopausal Status: premenopausal, LMP: Patient's last menstrual period was 10/16/2024 (exact date).  Hysterectomy/Oophorectomy: NA  Hormonal birth control (duration): 3 years   Pregnancy History: NA  Age at first live birth: NA  Hormone Replacement Therapy: No,  none    Other:  MG breast density: No breast composition recorded.   Prior thoracic RT: none  Genetic testing:  None  Ashkenazi Denominational descent: No    Family History:  Family History   Problem Relation Name Age of Onset    Hyperlipidemia Mother      Hypertension Mother      Anxiety disorder Mother      Hypertension Maternal Grandmother      Breast cancer Maternal Grandmother      Alzheimer's disease Maternal Grandmother      Breast cancer Maternal Aunt  45 - 55    Colon cancer Maternal Uncle      Prostate cancer Maternal Uncle      Cancer Maternal Uncle          Patient History:  Past Medical History:   Diagnosis Date    Obesity, unspecified     Scoliosis     Seborrheic dermatitis        Past Surgical History:   Procedure Laterality Date    SPINAL FUSION      Lumbar 2/2 scoliosis    WRIST SURGERY Right        Social History     Socioeconomic History    Marital status: Single   Tobacco Use    Smoking status: Never     Passive exposure: Never    Smokeless tobacco: Never   Substance and Sexual Activity    Alcohol use: Yes     Comment: Occasional wine/shots of liquor    Drug use: Never    Sexual activity: Not Currently       Immunization History   Administered Date(s) Administered    DTaP 01/12/1998, 03/16/1998, 09/17/1998, 10/01/2001    HPV 9-Valent 06/07/2012, 08/24/2012, 11/12/2013    Hepatitis A, Pediatric/Adolescent, 2 Dose 01/26/2015    Hepatitis B, Pediatric/Adolescent 1997, 1997, 03/16/1998    IPV 01/12/1998, 10/01/2001    Influenza - Trivalent - Fluarix, Flulaval, Fluzone, Afluria - PF 01/26/2015    MMR 09/17/1998, 10/01/2001    Meningococcal Conjugate (MCV4P) 06/07/2012, 11/12/2013    OPV 1997, 09/17/1998    Pneumococcal Conjugate - 7 Valent 10/01/2001    Tdap 06/07/2012    Varicella 09/17/1998, 06/07/2012       Medications/Allergies:    Current Outpatient Medications:     ciclopirox (LOPROX) 0.77 % Crea, Apply topically 2 (two) times daily. for 14 days, Disp: 15 g, Rfl: 0    ferrous gluconate  "(FERGON) 324 MG tablet, Take 1 tablet (324 mg total) by mouth 2 (two) times daily with meals., Disp: 60 tablet, Rfl: 1    fluconazole (DIFLUCAN) 150 MG Tab, Take 300 mg by mouth every 7 days., Disp: , Rfl:     fluocinolone and shower cap 0.01 % Oil, SMARTSI Milliliter(s) Topical Every Night PRN, Disp: , Rfl:     hydrocortisone 2.5 % ointment, Apply topically 2 (two) times daily as needed., Disp: , Rfl:     ketoconazole (NIZORAL) 2 % shampoo, Apply topically., Disp: , Rfl:     ondansetron (ZOFRAN-ODT) 4 MG TbDL, Take 1 tablet (4 mg total) by mouth every 8 (eight) hours as needed (nausea/vomiting)., Disp: 10 tablet, Rfl: 0    gabapentin (NEURONTIN) 300 MG capsule, Take 1 capsule (300 mg total) by mouth once daily. for 14 days (Patient not taking: Reported on 10/16/2024), Disp: 14 capsule, Rfl: 0    naltrexone-bupropion (CONTRAVE) 8-90 mg TbSR, Take 1 tablet by mouth once daily. (Patient not taking: Reported on 10/16/2024), Disp: 30 tablet, Rfl: 2     Review of patient's allergies indicates:   Allergen Reactions    Amoxil [amoxicillin] Itching and Rash       Review of Systems:  All pertinent history mentioned in HPI.     Objective:     Vitals:  Vitals:    10/16/24 0859   BP: 114/82   BP Location: Right arm   Patient Position: Sitting   Pulse: 77   Resp: 20   Temp: 98.1 °F (36.7 °C)   TempSrc: Oral   SpO2: 99%   Weight: 107.5 kg (237 lb)   Height: 5' 5" (1.651 m)       Body mass index is 39.44 kg/m².     Physical Exam:  General: The patient is awake, alert and oriented times three. The patient is well nourished and in no acute distress.  Neck: There is no evidence of palpable cervical, supraclavicular or axillary adenopathy. The neck is supple. The thyroid is not enlarged.  Musculoskeletal: The patient has a normal range of motion of her bilateral upper extremities.  Chest: Examination of the chest wall fails to reveal any obvious abnormalities.  The lungs are clear to auscultation bilaterally without rales, rhonchi, " or wheezing.  Cardiovascular: The heart has a regular rate and rhythm without murmurs, gallops or rubs.  Breast:   Right:  Examination of right breast fails to reveal any dominant masses or areas of significant focal nodularity. The nipple is everted without evidence of discharge. There is no skin dimpling with movement of the pectoralis. There are multiple scars noted in inframammary fold.  Left:  Examination of the left breast fails to reveal any dominant masses or areas of significant focal nodularity. The nipple is everted without evidence of discharge. There is no skin dimpling with movement of the pectoralis. There are multiple scars noted in inframammary fold.  Abdomen: The abdomen is soft, flat, nontender and nondistended with no palpable masses or organomegaly.  Integumentary: no rashes or skin lesions present.   Neurologic: cranial nerves intact, no signs of peripheral neurological deficit, motor/sensory function intact    Assessment:     Patient Active Problem List   Diagnosis    Morbid obesity    Hyperlipidemia    Left ovarian cyst    At high risk for breast cancer    Sebaceous cyst of skin of both breasts    Iron deficiency        Mavis was seen today for breast mass.    Diagnoses and all orders for this visit:    Sebaceous cyst of skin of both breasts  -     Ambulatory referral/consult to Breast Surgery    At high risk for breast cancer  -     Ambulatory referral/consult to Breast Surgery       --------------------------------------------------------------------------------------------------------------  After the initial clinical evaluation nearly 30 minutes were on counseling the patients regarding the options for management. Risk reduction strategies were discussed.     1. Lifestyle factors: As with other types of cancer, studies continue to show that various lifestyle factors may contribute to the development of breast cancer.     Weight: Recent studies have shown that postmenopausal women who are  "overweight or obese have an increased risk of breast cancer. These women also have a higher risk of having the cancer come back after treatment.     Physical activity: Decreased physical activity is associated with an increased risk of developing breast cancer and a higher risk of having the cancer come back after treatment. Regular physical activity may protect against breast cancer by helping women maintain a healthy body weight, lowering hormone levels, or causing changes in a womens metabolism or immune factors.     Alcohol: Current research suggests that having more than 1 to 2 alcoholic drinks, including beer, wine, and spirits, per day raises the risk of breast cancer, as well as the risk of having the cancer come back after treatment.     Food: There is no reliable research that confirms that eating or avoiding specific foods reduces the risk of developing breast cancer or having the cancer come back after treatment. However, eating more fruits and vegetables and fewer animal fats is linked with many health benefits.     2. Prevention:  Surgery to lower cancer risk: For women with BRCA1 or BRCA2 genetic mutations, which substantially increase the risk of breast cancer, preventive removal of the breasts may be considered. The procedure, called a prophylactic mastectomy, appears to reduce the risk of developing breast cancer by at least 95%. Women with these mutations should also consider the preventive removal of the ovaries and fallopian tubes, called a prophylactic salpingo-oophorectomy. This procedure can reduce the risk of developing ovarian cancer, as well as breast cancer, by stopping the ovaries from making estrogen.      Drugs to lower cancer risk (Chemoprevention): Women who have a higher than usual risk of developing breast cancer may consider certain drugs that may help prevent breast cancer. This approach may also be called "chemoprevention." For breast cancer, this is the use of hormone-blocking " drugs to reduce cancer risk. The drugs, tamoxifen (Soltamox) and raloxifene (Evista), are approved by the U.S. Food and Drug Administration (FDA) to lower breast cancer risk. These drugs are called selective estrogen receptor modulators (SERMs) and are not chemotherapy. A SERM is a medication that blocks estrogen receptors in some tissues and not others. Both women who have gone through menopause and those who have not may take tamoxifen. Raloxifene is only approved for women who have gone through menopause. Each drug also has different side effects.     Aromatase inhibitors (AIs) have also been shown to lower breast cancer risk. AIs are a type of hormone-blocking treatment that reduces the amount of estrogen in a woman's body by stopping tissues and organs other than the ovaries from producing estrogen. They can only be used by women who have gone through menopause. However, no AIs have been approved by the FDA for lowering breast cancer risk in women who do not have the disease.     3. Surveillance: Women with a known genetic mutation should follow screening guidelines per the NCCN guidelines. Women with no known genetic mutation  and found to be at greater than 20 percent average lifetime risk of breast cancer are recommended for the following screening recommendations:     Clinical Breast exam: Every 6-12 months starting at age found to be at increased risk by risk model     Mammogram: Per NCCN guidelines, recommended every year starting 10 years younger than the youngest breast cancer case in the family (but not before age 30). May consider beginning breast MRIs at age 30 per ACR guidelines if desired by patient or other clinical considerations. May also consider getting a baseline MG at time of initial high risk consultation, if not already obtained.     Breast MRI: Per NCCN guidelines, recommended every year starting 10 years younger than the youngest breast cancer case in the family (but not before age 25).  May consider beginning breast MRIs at age 30 per ACR guidelines if desired by patient or other clinical considerations. If patient has a first-degree relative with a BRCA1/2 gene mutation, youre encouraged to get genetic counseling and/or testing before getting MRI as part of screening (for those who do not wish to have genetic testing, MRI is recommended). Breast MRI in combination with mammography is better than mammography alone at finding breast cancer in certain women at higher than average risk.     --------------------------------------------------------------------------------------------------------------    I discussed ways to reduce breast pain/tenderness. Avoid caffeine (coffee, teas, chocolate, sodas). Drinking alcohol may also increase the risk of fibrocystic changes and breast pain. I also advised to wear a good, supportive bra both day and night when symptoms are worse. Avoid contact sports and other activities which could cause injury to the breasts. She may also consider taking Vitamin E/Primrose Oil supplementation to reduce pain.  She may consider Voltaren gel as needed for breast pain.    Hidradenitis suppurativa (HS) is a chronic, painful, follicular, occlusive disease that affects the folliculopilosebaceous unit mainly, but not exclusively, in intertriginous axillary, groin, perianal, perineal, genital, and inframammary skin. The clinical course is highly variable, ranging from relatively mild disease characterized by the recurrent appearance of papules, pustules, and a few inflammatory nodules to severe cases demonstrating deep, fluctuant abscesses; draining sinuses; and severe, rope-like scars.  Features such as the extent of skin involvement and the presence of skin tunnels or scarring influence disease severity. The Rodriguez staging system is also used to define disease severity.    Numerous interventions exist for treatment of HS, including topical therapies, oral therapies, biologic  therapies, surgery, and laser and light interventions. Disease severity, patient tolerance of specific agents, comorbidities, and treatment cost and availability guide treatment selection.    Interventions for HS target one or more of three major goals:  ?To reduce formation of new inflammatory lesions, skin tunnels, and scarring  ?To treat existing lesions and reduce associated symptoms (eg, pain, suppuration)  ?To minimize associated psychologic morbidity    Interventions for all patients:  Education and support - communicate chronicity of disease that is neither contagious nor due to poor hygiene. Explain risk factors including smoking, obesity, and family history (although these factors do not necessary need to exist for diagnosis). The course of HS varies from person to person.    Wound and skin care - Provide guidance regarding skin care techniques and for flare ups. Some experts suggest daily use of topical antiseptic washes, such as chlorhexidine 4%, benzoyl peroxide, or zinc pyrithione, to cleanse skin in the affected areas. However, evidence to confirm benefit of these interventions for improving HS are lacking.    Pain management - Nonsteroidal anti-inflammatory drugs can be used to treat both pain and inflammation. A multidisciplinary approach may be helpful for identifying an appropriate pain management plan for patients experiencing chronic pain.    Assessment and management of comorbidities -- Patients with HS may be at increased risk for multiple health disorders, including alcohol dependence, autoimmune conditions, cardiovascular disease, diabetes, drug dependence, dyslipidemia, follicular occlusion syndromes, hypertension, inflammatory arthropathies, cutaneous squamous cell carcinoma, metabolic syndrome, nicotine addiction, obesity, polycystic ovary syndrome (PCOS)    Smoking cessation and weight loss -  We routinely advise smoking cessation or weight loss to our HS patients who smoke or are obese  based upon the potential for benefit in HS, the other recognized health benefits of these interventions, and observations that support increased risk for morbidity or mortality from related conditions among patients with HS    Management of acute, painful lesions - Patients with acute, painful, inflammatory nodules may benefit from interventions to accelerate improvement in symptoms in addition to interventions to improve the overall disease burden. Intralesional corticosteroid injections, punch debridement, or topical resorcinol may be used for this purpose. Incision and drainage is not advised for the routine treatment of acute lesions of HS. Incision and drainage should be restricted to situations in which immediate pain relief is necessary and other interventions for acute lesions are not feasible.    Interventions for Rodriguez Stage I disease (inflammatory lesions without skin tunnels or scarring):   Initial therapy - We suggest topical clindamycin as the initial medical treatment    Failure of initial therapy - If HS does not improve sufficiently with topical clindamycin, we suggest treatment with an oral tetracycline class antibiotic (Grade 2C). Antiandrogenic drugs and metformin are additional treatment options that may be used alone or in conjunction with antibiotics.     Refractory disease - Treatment options for refractory disease include clindamycin and rifampin combination therapy, acitretin, and oral dapsone.    Interventions for Rodriguez Stage II or III disease (inflammatory lesions with skin tunnels or scarring):   Initial therapy - We suggest oral antibiotic therapy as the initial medical treatment (Grade 2C). Our first choice for antibiotic therapy for patients with a moderate, inflammatory burden (eg, Rodriguez stage II disease) is typically an oral tetracycline (eg, doxycycline). For patients with extensive, inflammatory disease (eg, Rodriguez stage III HS) we typically begin with combination therapy with  clindamycin and rifampin. Antiandrogenic drugs and metformin may be helpful adjunctive therapies.     Failure of initial therapy -   ?Next-line therapies for patients who do not improve sufficiently with oral antibiotics and hormonal therapy include oral retinoids, dapsone, adalimumab, and infliximab. We typically treat with acitretin or dapsone prior to proceeding to biologic therapy, although other approaches are reasonable. For patients who do not improve sufficiently with oral antibiotics, retinoids, dapsone, and hormonal therapy, we suggest treatment with adalimumab (Grade 2A). Infliximab is a second-line biologic option.  ?Skin tunnels and recurrent nodules - Medical therapy can be insufficient for improving skin tunnels and some recurrent nodules. Surgical intervention may help to improve these features.    Severe, refractory disease - Patients with severe HS refractory to medical treatment are candidates for wide excision of skin in the affected areas. A variety of medical therapies have also been utilized for severe, refractory disease.      Plan:     1. Lifestyle - Healthy lifestyle guidelines were reviewed. She was encouraged to engage in regular exercise, maintain a healthy body weight, and avoid excessive alcohol consumption. Healthy nutritional guidelines were also discussed. Self-breast examination was reviewed with the patient in detail and she was encouraged to perform this on a monthly basis.    2. Surveillance - According to NCCN guidelines, patient should start undergoing high risk screening when she reaches the age of 40, which is 10 years younger than when her youngest known relative was diagnosed with breast cancer. According to ACR guidelines, patient can start undergoing screening at the age of 30 being that she is considered high risk. Until patient reaches age 30, I recommend clinical breast exams every 6-12 months. Will see patient back in 6 months for CBE.     3. Prevention - We had a  brief discussion/education about indications for preventative mastectomy or chemoprevention.  These methods are not recommended to her at this time.    4. Genetics - According to NCCN guidelines, she meets criteria for genetic testing. Referral to genetic counseling placed. Please send referral to Maryjo Sloan for further evaluation.    5. Hidradenitis Suppurativa - Patient's presentation is most consistent with HS. Discussed recommendations with patient. Will have patient follow up with her dermatologist in regards to this.     6. Breast Pain - I discussed ways to reduce breast pain/tenderness. Avoid caffeine (coffee, teas, chocolate, sodas). Drinking alcohol may also increase the risk of fibrocystic changes and breast pain. I also advised to wear a good, supportive bra both day and night when symptoms are worse. Avoid contact sports and other activities which could cause injury to the breasts. She may also consider taking Vitamin E/Primrose Oil supplementation to reduce pain.  She may consider Voltaren gel as needed for breast pain.    Other routine screening exams:   -  Recommend annual follow up with PCP.   -  Recommend annual follow up with GYN for pap smears/gynecologic exams and CBE.   -  GI: Recommend start colorectal cancer screening at age 45 in average risk individuals. This can be done either with a sensitive test that looks for signs of cancer in a persons stool (a stool-based test), or with an exam that looks at the colon and rectum (a visual exam). Patient's at an increased risk for CRC (ex. Personal or family history of CRC, certain types of polyps, genetic disorders, IBD, or hx of abdominal radiation) should start screening prior to age 45.       All of her questions were answered. She was advised to call if she develops any questions or concerns.    Dot Nguyen PA-C     --------------------------------------------------------------------------------------------------------------  Total time on  the date of the visit ranged from 60-74 mins ([id number]). Total time includes both face-to-face and non-face-to-face time personally spent by myself on the day of the visit.    Non-face-to-face time included:  _X_ preparing to see the patient such as reviewing the patient record  _X_ obtaining and reviewing separately obtained history  _X_ independently interpreting results  _X_ documenting clinical information in electronic health record.    Face-to-face time included:  _X_ performing an appropriate history and examination  _X_ communicating results to the patient  _X_ counseling and educating the patient  __ ordering appropriate medications  _x_ ordering appropriate tests  _X_ ordering appropriate procedures (including follow-up)  _X_ answering any questions the patient had    Total Time spent on date of visit: 70 minutes

## 2024-10-16 ENCOUNTER — OFFICE VISIT (OUTPATIENT)
Dept: SURGERY | Facility: CLINIC | Age: 29
End: 2024-10-16
Payer: COMMERCIAL

## 2024-10-16 VITALS
BODY MASS INDEX: 39.49 KG/M2 | HEIGHT: 65 IN | HEART RATE: 77 BPM | TEMPERATURE: 98 F | RESPIRATION RATE: 20 BRPM | WEIGHT: 237 LBS | DIASTOLIC BLOOD PRESSURE: 82 MMHG | OXYGEN SATURATION: 99 % | SYSTOLIC BLOOD PRESSURE: 114 MMHG

## 2024-10-16 DIAGNOSIS — N60.82 SEBACEOUS CYST OF SKIN OF BOTH BREASTS: ICD-10-CM

## 2024-10-16 DIAGNOSIS — Z91.89 AT HIGH RISK FOR BREAST CANCER: ICD-10-CM

## 2024-10-16 DIAGNOSIS — N60.81 SEBACEOUS CYST OF SKIN OF BOTH BREASTS: ICD-10-CM

## 2024-10-16 PROCEDURE — 3079F DIAST BP 80-89 MM HG: CPT | Mod: CPTII,S$GLB,,

## 2024-10-16 PROCEDURE — 3061F NEG MICROALBUMINURIA REV: CPT | Mod: CPTII,S$GLB,,

## 2024-10-16 PROCEDURE — 99999 PR PBB SHADOW E&M-EST. PATIENT-LVL V: CPT | Mod: PBBFAC,,,

## 2024-10-16 PROCEDURE — 1160F RVW MEDS BY RX/DR IN RCRD: CPT | Mod: CPTII,S$GLB,,

## 2024-10-16 PROCEDURE — 3066F NEPHROPATHY DOC TX: CPT | Mod: CPTII,S$GLB,,

## 2024-10-16 PROCEDURE — 3008F BODY MASS INDEX DOCD: CPT | Mod: CPTII,S$GLB,,

## 2024-10-16 PROCEDURE — 3044F HG A1C LEVEL LT 7.0%: CPT | Mod: CPTII,S$GLB,,

## 2024-10-16 PROCEDURE — 99205 OFFICE O/P NEW HI 60 MIN: CPT | Mod: S$GLB,,,

## 2024-10-16 PROCEDURE — 1159F MED LIST DOCD IN RCRD: CPT | Mod: CPTII,S$GLB,,

## 2024-10-16 PROCEDURE — 3074F SYST BP LT 130 MM HG: CPT | Mod: CPTII,S$GLB,,

## 2024-10-16 RX ORDER — HYDROCORTISONE 25 MG/G
OINTMENT TOPICAL 2 TIMES DAILY PRN
COMMUNITY
Start: 2024-10-09

## 2024-10-16 RX ORDER — KETOCONAZOLE 20 MG/ML
SHAMPOO, SUSPENSION TOPICAL
COMMUNITY
Start: 2024-10-11

## 2024-10-16 RX ORDER — FLUCONAZOLE 150 MG/1
300 TABLET ORAL
COMMUNITY
Start: 2024-10-09

## 2024-10-16 RX ORDER — FLUOCINOLONE ACETONIDE 0.11 MG/ML
OIL TOPICAL
COMMUNITY
Start: 2024-10-09

## 2024-10-16 NOTE — PATIENT INSTRUCTIONS
What is breast pain?  Breast pain (mastalgia) is a common type of discomfort among women, affecting as many as seven in 10 women at some point in their lives.  About 10 percent of women have moderate to severe breast pain more than five days a month. In some cases, severe breast pain lasts throughout the menstrual cycles.  The symptom occurs most frequently in young, premenopausal and perimenopausal women.  Breast pain alone rarely signifies breast cancer. Still, if you have unexplained breast pain that persists, get checked by your provider.     Causes of breast pain  Most of the time, it's not possible to identify the exact cause of breast pain. Likely contributors are hormonal changes, anatomical issues (breast trauma, prior breast surgery, or chest wall pain), breast size, and medication use (oral contraceptives, infertility treatments, estrogen/progesterone hormone therapy, and some antidepressants).     Treatments and supplements for breast pain  Use hot or cold compresses on your breasts.  Wear a firm support bra, fitted by a professional if possible.  Wear a sports bra during exercise and while sleeping, especially when your breast may be more sensitive.  Limit or eliminate caffeine, a dietary change many women swear by.  Decrease the fat in your diet.  Use a pain reliever, such as acetaminophen (Tylenol) or ibuprofen (Advil, Motrin) to alleviate breast pain.  May try Voltaren gel topical applications (2 g up to 4 times per day) to affected area of the breast.  Keep a journal noting when you experience breast pain and other symptoms, to determine if your pain is cyclic.  Vitamin E can help with breast pain (up to1,200 IU per day). Many women have found it to be helpful, but it usually takes up to 2 months of taking this to see a change.  Evening primrose oil; this supplement appears to change the balance of fatty acids in your cells, which may reduce breast pain. You can take a 1000 mg capsule up to three  times a day.       Hidradenitis suppurativa (HS) is a chronic, painful, follicular, occlusive disease that affects the folliculopilosebaceous unit mainly, but not exclusively, in intertriginous axillary, groin, perianal, perineal, genital, and inframammary skin. The clinical course is highly variable, ranging from relatively mild disease characterized by the recurrent appearance of papules, pustules, and a few inflammatory nodules to severe cases demonstrating deep, fluctuant abscesses; draining sinuses; and severe, rope-like scars.  Features such as the extent of skin involvement and the presence of skin tunnels or scarring influence disease severity. The Rodriguez staging system is also used to define disease severity.    Numerous interventions exist for treatment of HS, including topical therapies, oral therapies, biologic therapies, surgery, and laser and light interventions. Disease severity, patient tolerance of specific agents, comorbidities, and treatment cost and availability guide treatment selection.    Interventions for HS target one or more of three major goals:  ?To reduce formation of new inflammatory lesions, skin tunnels, and scarring  ?To treat existing lesions and reduce associated symptoms (eg, pain, suppuration)  ?To minimize associated psychologic morbidity    Interventions for all patients:  Education and support - communicate chronicity of disease that is neither contagious nor due to poor hygiene. Explain risk factors including smoking, obesity, and family history (although these factors do not necessary need to exist for diagnosis). The course of HS varies from person to person.    Wound and skin care - Provide guidance regarding skin care techniques and for flare ups. Some experts suggest daily use of topical antiseptic washes, such as chlorhexidine 4%, benzoyl peroxide, or zinc pyrithione, to cleanse skin in the affected areas. However, evidence to confirm benefit of these interventions for  improving HS are lacking.    Pain management - Nonsteroidal anti-inflammatory drugs can be used to treat both pain and inflammation. A multidisciplinary approach may be helpful for identifying an appropriate pain management plan for patients experiencing chronic pain.    Assessment and management of comorbidities -- Patients with HS may be at increased risk for multiple health disorders, including alcohol dependence, autoimmune conditions, cardiovascular disease, diabetes, drug dependence, dyslipidemia, follicular occlusion syndromes, hypertension, inflammatory arthropathies, cutaneous squamous cell carcinoma, metabolic syndrome, nicotine addiction, obesity, polycystic ovary syndrome (PCOS)    Smoking cessation and weight loss -  We routinely advise smoking cessation or weight loss to our HS patients who smoke or are obese based upon the potential for benefit in HS, the other recognized health benefits of these interventions, and observations that support increased risk for morbidity or mortality from related conditions among patients with HS    Management of acute, painful lesions - Patients with acute, painful, inflammatory nodules may benefit from interventions to accelerate improvement in symptoms in addition to interventions to improve the overall disease burden. Intralesional corticosteroid injections, punch debridement, or topical resorcinol may be used for this purpose. Incision and drainage is not advised for the routine treatment of acute lesions of HS. Incision and drainage should be restricted to situations in which immediate pain relief is necessary and other interventions for acute lesions are not feasible.    Interventions for Rodriguez Stage I disease (inflammatory lesions without skin tunnels or scarring):   Initial therapy - We suggest topical clindamycin as the initial medical treatment    Failure of initial therapy - If HS does not improve sufficiently with topical clindamycin, we suggest treatment  with an oral tetracycline class antibiotic (Grade 2C). Antiandrogenic drugs and metformin are additional treatment options that may be used alone or in conjunction with antibiotics.     Refractory disease - Treatment options for refractory disease include clindamycin and rifampin combination therapy, acitretin, and oral dapsone.    Interventions for Rodriguez Stage II or III disease (inflammatory lesions with skin tunnels or scarring):   Initial therapy - We suggest oral antibiotic therapy as the initial medical treatment (Grade 2C). Our first choice for antibiotic therapy for patients with a moderate, inflammatory burden (eg, Rodriguez stage II disease) is typically an oral tetracycline (eg, doxycycline). For patients with extensive, inflammatory disease (eg, Rodriguez stage III HS) we typically begin with combination therapy with clindamycin and rifampin. Antiandrogenic drugs and metformin may be helpful adjunctive therapies.     Failure of initial therapy -   ?Next-line therapies for patients who do not improve sufficiently with oral antibiotics and hormonal therapy include oral retinoids, dapsone, adalimumab, and infliximab. We typically treat with acitretin or dapsone prior to proceeding to biologic therapy, although other approaches are reasonable. For patients who do not improve sufficiently with oral antibiotics, retinoids, dapsone, and hormonal therapy, we suggest treatment with adalimumab (Grade 2A). Infliximab is a second-line biologic option.  ?Skin tunnels and recurrent nodules - Medical therapy can be insufficient for improving skin tunnels and some recurrent nodules. Surgical intervention may help to improve these features.    Severe, refractory disease - Patients with severe HS refractory to medical treatment are candidates for wide excision of skin in the affected areas. A variety of medical therapies have also been utilized for severe, refractory disease.

## 2024-10-30 NOTE — PROGRESS NOTES
"  Cancer Genetics  [facility] - Breast Surgery  [facility]          Date of Service:  2024  Provider:  Maryjo Sloan MS, [facility] [place] [facility]  Collaborating physician: Gayle Ross MD    Patient Information  Name:  Mavis Oliveira  :  1995  MRN:  [id number]        Referring Provider: Dot Nguyen PA-C     Visit type: in-person.   Face-to-face time with patient: approximately 35 minutes.  Approximately 80 minutes in total were spent on this encounter, which includes face-to-face time and non-face-to-face time preparing to see the patient (e.g., review of tests), obtaining and/or reviewing separately obtained history, documenting clinical information in the electronic or other health record, independently interpreting results (not   reported) and communicating results to the patient/family/caregiver, or care coordination (not separately reported).      SUBJECTIVE:      Reason for Referral: At high risk for breast cancer    History of Present Illness (HPI):  Mavis Oliveira ("Mavis"), 29 y.o., assigned female sex at birth is established to [facility] - Breast Surgery department but new to me.  She was referred by Breast Surgery for genetic cancer risk assessment given her family history of breast cancer. She has complained about bilateral intermittent nipple pain that has been going on for a couple of months. She describes this pain as a throbbing pain. She states the pain is worse when she takes her bra off in around the time of her menstrual cycles. She does not drink excessive caffeine. She does wear bras with underwire. She also complains of bilateral intermittent "cysts" that appear in her inframammary folds. Patient describes these cysts/lumps as painful. She has never had to have any of these cysts previously drained.  She has not experienced these cysts anywhere else in her body such as in her axilla or groin.  She has never been previously " diagnosed with hidradenitis suppurativa. She currently denies any other breast issues including rashes, redness, swelling, nipple discharge, or new lumps/masses. She did recently undergo bilateral breast ultrasound in 09/2024 for further evaluation of breast pain in bilateral cysts in inframammary folds.  Ultrasound revealed no evidence of malignancy in either breast, however, there were inflamed sebaceous cyst noted in bilateral inframammary folds.     Focused Medical History:   Germline cancer-genetic testing:  No  Cancer:  No  Colonoscopy: No  Mammogram: No  Breast MRI:  No  Other benign tumor:  Yes  Left ovarian cyst (08/2024)  Reported that she received birth control but have not began taking it, experiencing pain throughout  Sebaceous cyst of skin of both breasts (09/2024)  Pancreatitis:  No  Pancreatic cyst:  No  Reproductive organs intact    Past Medical History:   Diagnosis Date    Obesity, unspecified     Scoliosis     Seborrheic dermatitis        Patient Active Problem List    Diagnosis Date Noted    At high risk for breast cancer 09/25/2024    Sebaceous cyst of skin of both breasts 09/25/2024    Iron deficiency 09/25/2024    Left ovarian cyst 08/12/2024    Morbid obesity 04/09/2024    Hyperlipidemia 04/09/2024        Past Surgical History:   Procedure Laterality Date    SPINAL FUSION      Lumbar 2/2 scoliosis    WRIST SURGERY Right      Ancestry:  Ashkenazi Buddhism ancestry:  No  Paternal:    Maternal:     Focused Family History:  Consanguinity in ancestors:  No  Germline cancer-genetic testing in blood relatives:  Yes  Mother (4-5 years ago) - BRCA1 analysis only, negative  Maternal aunt (~5 years ago) - BRCA1/BRCA2 analysis  BRCA1 mutation, unable to retrieve a copy of test result  Cancer in family:  Yes; there are no known blood relatives affected with cancer other than those mentioned in the pedigree below    Family Cancer Pedigree:         Review of Systems:  See HPI.       SUBJECTIVE:   Records Reviewed  Medical History  Problem List  Any pertinent, available Procedures and Pathology reports in both [facility] and [facility]    COUNSELING   Causes of cancer  Germline cancer genetic testing is the testing of genes associated with cancer, known as cancer susceptibility genes.  Just as these genes are inherited from parents, mutations in these genes can be inherited, as well.  A mutation in a cancer susceptibility gene adversely affects the gene's ability to prevent cancer; therefore, carriers of cancer susceptibility gene mutations may be at increased risk for certain cancers.     Only 5-10% cancers are caused by a cancer susceptibility gene mutation, meaning the cancer is genetic/hereditary; rather, most cancers are sporadic.  Causes of sporadic cancers may include environmental risk factors, lifestyle risk factors, and non-modifiable risk factors.  It is important to note that members of a family often share not only their genetics but also risk factors including environmental and lifestyle risk factors, so cancers can be familial.    Mutations in BRCA1 and BRCA2 are associated with an increased risk for breast and ovarian cancer, in addition to male breast cancer, pancreatic, melanoma, and prostate cancer. Mutations in other genes may increase the risk of breast and prostate cancer, in addition to other cancers.      Potential results of genetic testing, and their implications  Potential results of genetic testing include positive, negative, and variant of unknown significance (VUS).    A positive result indicates the presence of at least one clinically significant mutation, and the patient's associated cancer risks vary depending upon the cancer susceptibility gene(s) in which there is/are a mutation(s).  With a positive result, in some cases, depending upon the specific result and the patient's clinical history, modified risk management may be recommended,  including measures for risk reduction and/or surveillance; however, modified management is not always an option.    A negative result indicates that no clinically significant mutations were identified in the gene(s) tested.    A VUS indicates that there is not presently enough data for the laboratory to make a determination as to whether the variant is clinically significant.  VUSs are not typically acted upon clinically.       The ability to interpret the meaning of a negative genetic testing result in genes associated with cancer with which the patient has not personally been affected, when done prior to testing the appropriate affected relative(s), is significantly limited.  A negative result in the patient does not indicate that she cannot develop cancer, and, in fact, the patient may even be at increased risk for cancer based on shared risk factors with affected relatives.  The most informative candidates for initial genetic testing in a family are those who have been affected with cancer.     Modified management may also be recommended, even with a result of no or unknown significance, based upon risk assessment that incorporates the family history.      Tyer Cuzik Score  Breast Cancer Risk Stratification  Current estimated lifetime risk of breast cancer, as calculated utilizing the Tyrer-Cuzick risk-assessment model v8.0b:  25.9%.  This places the patient in the high-risk range according to current clinical guidelines. As such, the NCCN guidelines recommend that she continue annual mammogram and annual breast MRI.     Genetic mutation inheritance  If  Mavis tests positive for a mutation, her first-degree relatives would each have a 50% chance of having the same mutation, and other, more distantly related blood-relatives would also be at risk of having the same mutation.       Genetic discrimination  The Genetic Information Nondiscrimination Act (ALAINA) is [place] federal legislation that provides some protections  against use of an individual's genetic information by their health insurer and by their employer.  Title I of ALAINA prohibits most health insurers from utilizing an individual's genetic information to make decisions regarding insurance eligibility or premium charges.  Title II of ALAINA prohibits covered entities, including employers, from requesting the genetic information of employees and applicants.  ALAINA does not protect individuals from genetic discrimination toward health insurance obtained through a job with the  or through the Federal Employees Health Benefits Plan; from genetic discrimination by employers with fewer than 15 employees or if employed by the ; or from genetic discrimination by any other type of policies/entities, including but not limited to life insurance, disability insurance, long-term care insurance,  benefits, and  Health Services benefits.     Genetic testing logistics  An outside laboratory would perform the testing after a blood sample is collected or a saliva sample is collected by the patient at home.  With genetic testing, there is a potential for the patient to incur out-of-pocket costs.  Results can take 2-3 weeks.  Post-test genetic counseling can be conducted once the genetic testing results are available.     Based on the information provided by Mavis, she meets current criteria for genetic testing of hereditary breast  according to current clinical guidelines. Mavis's clinical history, including personal history and/or family history, is suggestive of a hereditary cancer syndrome in the family given the family history of breast cancer, including a second-degree relative diagnosed before age 50, as well as a strong maternal family history of prostate cancer and ovarian cancer.    Assessment    ICD-10   Family history of breast cancer                                                                  Z80.3   2.   At high risk for breast cancer                                                                   Z91.89   3.   Family history of ovarian cancer                                                                  Z80.41   4.   Family history of prostate cancer                                                                 Z80.42       Plan     Family history of breast cancer   Offered germline cancer-genetic testing at this time versus deferring testing at this time or declining testing altogether.  Various test panel options were discussed. Mavis agreed to proceed with genetic testing [facility] 70-gene Multi-Cancer Panel (AIP, ALK, APC, ELANA, AXIN2, BAP1, BARD1, BLM, BMPR1A, BRCA1, BRCA2, BRIP1, CDC73, CDH1, CDK4, CDKN1B, CDKN2A, CHEK2, CTNNA1, DICER1, EGFR, EPCAM, FH, FLCN, GREM1, HOXB13, KIT, LZTR1, MAX, MBD4, MEN1, MET, MITF, MLH1, MSH2, MSH3, MSH6, MUTYH, NF1, NF2, NTHL1, PALB2, PDGFRA, PMS2, POLD1, POLE, POT1, VPXJA3F, PTCH1, PTEN, RAD51C, RAD51D, RB1, RET, SDHA, SDHAF2, SDHB, SDHC, SDHD, SMAD4, SMARCA4, SMARCB1, SMARCE1, STK11, SUFU, [id number], TP53, TSC1, TSC2, VHL).  Mavis has provided her informed consent to proceed. A DNA sample (blood) was collected today     Questions were encouraged and answered to the patient's satisfaction, and she verbalized understanding of information and agreement with the plan.         Signed,    Maryjo Sloan MS, [facility] [place]  Licensed - Certified Genetic Counselor  [facility] - Breast Surgery    11/04/2024

## 2024-11-04 ENCOUNTER — OFFICE VISIT (OUTPATIENT)
Dept: SURGERY | Facility: CLINIC | Age: 29
End: 2024-11-04
Payer: COMMERCIAL

## 2024-11-04 DIAGNOSIS — Z80.42 FAMILY HISTORY OF PROSTATE CANCER: ICD-10-CM

## 2024-11-04 DIAGNOSIS — Z91.89 AT HIGH RISK FOR BREAST CANCER: ICD-10-CM

## 2024-11-04 DIAGNOSIS — Z80.41 FAMILY HISTORY OF OVARIAN CANCER: ICD-10-CM

## 2024-11-04 DIAGNOSIS — Z80.3 FAMILY HISTORY OF BREAST CANCER: Primary | ICD-10-CM

## 2024-11-04 PROCEDURE — 99999 PR PBB SHADOW E&M-EST. PATIENT-LVL II: CPT | Mod: PBBFAC,,,

## 2024-11-10 DIAGNOSIS — E61.1 IRON DEFICIENCY: ICD-10-CM

## 2024-11-11 ENCOUNTER — PATIENT MESSAGE (OUTPATIENT)
Dept: SURGERY | Facility: CLINIC | Age: 29
End: 2024-11-11
Payer: COMMERCIAL

## 2024-11-11 RX ORDER — FERROUS GLUCONATE 324(38)MG
1 TABLET ORAL EVERY OTHER DAY
Qty: 30 TABLET | Refills: 3 | Status: SHIPPED | OUTPATIENT
Start: 2024-11-11

## 2024-12-21 ENCOUNTER — OFFICE VISIT (OUTPATIENT)
Dept: URGENT CARE | Facility: CLINIC | Age: 29
End: 2024-12-21
Payer: COMMERCIAL

## 2024-12-21 VITALS
DIASTOLIC BLOOD PRESSURE: 79 MMHG | OXYGEN SATURATION: 99 % | BODY MASS INDEX: 36.96 KG/M2 | SYSTOLIC BLOOD PRESSURE: 107 MMHG | TEMPERATURE: 99 F | RESPIRATION RATE: 18 BRPM | HEIGHT: 66 IN | HEART RATE: 83 BPM | WEIGHT: 230 LBS

## 2024-12-21 DIAGNOSIS — J06.9 VIRAL UPPER RESPIRATORY TRACT INFECTION: Primary | ICD-10-CM

## 2024-12-21 DIAGNOSIS — R05.1 ACUTE COUGH: ICD-10-CM

## 2024-12-21 RX ORDER — DEXAMETHASONE SODIUM PHOSPHATE 10 MG/ML
10 INJECTION INTRAMUSCULAR; INTRAVENOUS
Status: COMPLETED | OUTPATIENT
Start: 2024-12-21 | End: 2024-12-21

## 2024-12-21 RX ORDER — PROMETHAZINE HYDROCHLORIDE AND DEXTROMETHORPHAN HYDROBROMIDE 6.25; 15 MG/5ML; MG/5ML
5 SYRUP ORAL EVERY 4 HOURS PRN
Qty: 180 ML | Refills: 0 | Status: SHIPPED | OUTPATIENT
Start: 2024-12-21 | End: 2024-12-31

## 2024-12-21 RX ADMIN — DEXAMETHASONE SODIUM PHOSPHATE 10 MG: 10 INJECTION INTRAMUSCULAR; INTRAVENOUS at 02:12

## 2024-12-21 NOTE — PATIENT INSTRUCTIONS
Nasal saline, Flonase nasal spray, Claritin OTC as directed  Take Tylenol or ibuprofen OTC for fever and pain as directed  take Mucinex OTC for cough as directed or prescription promethazine DM preferably at night for cough   follow up with your primary care provider within 2-5 days if your signs and symptoms have not resolved or worsen.   If condition worsens or fails to improve we recommend that you receive another evaluation with your primary medical clinic to discuss your concerns.

## 2024-12-21 NOTE — PROGRESS NOTES
"Subjective:      Patient ID: Mavis Oliveira is a 29 y.o. female.    Vitals:  height is 5' 6" (1.676 m) and weight is 104.3 kg (230 lb). Her oral temperature is 98.9 °F (37.2 °C). Her blood pressure is 107/79 and her pulse is 83. Her respiration is 18 and oxygen saturation is 99%.     Chief Complaint: Cough     Patient is a 29 y.o. female who presents to urgent care with complaints of headache and coughing up phlegm  x2 days. Alleviating factors include Ibuprofen  with no relief. Patient denies body aches , sore throat, N/V, chills, fever and congestion .     Cough        HENT:  Positive for congestion and sinus pressure.    Respiratory:  Positive for cough.       Objective:     Physical Exam   Constitutional: She is oriented to person, place, and time. She appears well-developed. She is cooperative.  Non-toxic appearance. She does not appear ill. No distress.   HENT:   Head: Normocephalic and atraumatic.   Ears:   Right Ear: Hearing, tympanic membrane, external ear and ear canal normal.   Left Ear: Hearing, tympanic membrane, external ear and ear canal normal.   Nose: Nose normal. No mucosal edema, rhinorrhea or nasal deformity. No epistaxis. Right sinus exhibits no maxillary sinus tenderness and no frontal sinus tenderness. Left sinus exhibits no maxillary sinus tenderness and no frontal sinus tenderness.   Mouth/Throat: Uvula is midline, oropharynx is clear and moist and mucous membranes are normal. No trismus in the jaw. Normal dentition. No uvula swelling. No oropharyngeal exudate, posterior oropharyngeal edema or posterior oropharyngeal erythema.   Eyes: Conjunctivae and lids are normal. No scleral icterus.   Neck: Trachea normal and phonation normal. Neck supple. No edema present. No erythema present. No neck rigidity present.   Cardiovascular: Normal rate, regular rhythm, normal heart sounds and normal pulses.   Pulmonary/Chest: Effort normal and breath sounds normal. No respiratory distress. She has no " decreased breath sounds. She has no rhonchi.   Abdominal: Normal appearance.   Musculoskeletal: Normal range of motion.         General: No deformity. Normal range of motion.   Neurological: She is alert and oriented to person, place, and time. She exhibits normal muscle tone. Coordination normal.   Skin: Skin is warm, dry, intact, not diaphoretic and not pale.   Psychiatric: Her speech is normal and behavior is normal. Judgment and thought content normal.   Nursing note and vitals reviewed.    Previous History:     Review of patient's allergies indicates:   Allergen Reactions    Amoxil [amoxicillin] Itching and Rash       Past Medical History:   Diagnosis Date    Genetic testing 2024    [employer] Multi Cancer Panel (70 genes) - NEGATIVE, VUS in EGFR    Obesity, unspecified     Scoliosis     Seborrheic dermatitis      Current Outpatient Medications   Medication Instructions    ciclopirox (LOPROX) 0.77 % Crea Topical (Top), 2 times daily    ferrous gluconate (FERGON) 324 mg, Oral, Every other day    fluconazole (DIFLUCAN) 300 mg, Every 7 days    fluocinolone and shower cap 0.01 % Oil SMARTSI Milliliter(s) Topical Every Night PRN    gabapentin (NEURONTIN) 300 mg, Oral, Daily    hydrocortisone 2.5 % ointment 2 times daily PRN    ketoconazole (NIZORAL) 2 % shampoo Apply topically.    naltrexone-bupropion (CONTRAVE) 8-90 mg TbSR 1 tablet, Oral, Daily    ondansetron (ZOFRAN-ODT) 4 mg, Oral, Every 8 hours PRN    promethazine-dextromethorphan (PROMETHAZINE-DM) 6.25-15 mg/5 mL Syrp 5 mLs, Oral, Every 4 hours PRN     Past Surgical History:   Procedure Laterality Date    SPINAL FUSION      Lumbar 2/2 scoliosis    WRIST SURGERY Right      Family History   Problem Relation Name Age of Onset    BRCA1 Negative Mother Sujata         BRCA1 analysis only, unable to retrieve report    Hyperlipidemia Mother Sujata     Hypertension Mother Sujata     Anxiety disorder Mother Sujata     Hypertension Maternal Grandmother       Breast cancer Maternal Grandmother  67        UL mastectomy    Alzheimer's disease Maternal Grandmother      BRCA1 Positive Maternal Aunt          unable to retrieve a copy of report, can't confirm    Breast cancer Maternal Aunt  48        BL mastectomy    Prostate cancer Other      Prostate cancer Other      Prostate cancer Other          recurrence    Colon cancer Other      Ovarian cancer Other      Breast cancer Other      Breast cancer Other      Breast cancer Other      Breast cancer Other      Breast cancer Other      Cancer Other          eye?       Social History     Tobacco Use    Smoking status: Never     Passive exposure: Never    Smokeless tobacco: Never   Substance Use Topics    Alcohol use: Yes     Comment: Occasional wine/shots of liquor    Drug use: Never       Assessment:     1. Viral upper respiratory tract infection    2. Acute cough        Plan:   Nasal saline, Flonase nasal spray, Claritin OTC as directed  Take Tylenol or ibuprofen OTC for fever and pain as directed  take Mucinex OTC for cough as directed or prescription promethazine DM preferably at night for cough   follow up with your primary care provider within 2-5 days if your signs and symptoms have not resolved or worsen.   If condition worsens or fails to improve we recommend that you receive another evaluation with your primary medical clinic to discuss your concerns.      Viral upper respiratory tract infection    Acute cough  -     dexAMETHasone injection 10 mg  -     promethazine-dextromethorphan (PROMETHAZINE-DM) 6.25-15 mg/5 mL Syrp; Take 5 mLs by mouth every 4 (four) hours as needed (cough).  Dispense: 180 mL; Refill: 0

## 2025-04-17 NOTE — PROGRESS NOTES
"[facility] Breast Surg  Breast Surgical Oncology  Patient Office Visit - H&P      Referring Provider: No ref. provider found  PCP: Becki Peck MD   Care Team:  OBGYN: No data on file.    Chief Complaint:   Chief Complaint   Patient presents with    Follow-up     Patient c/o of ongoing back pain due to breast size, would like referral for breast reduction        Subjective:     Interval: 04/21/2025 Patient here for high risk follow up.  She is doing well today.  She currently denies any new breast complaints today. She did undergo genetic testing in the interval which resulted as negative aside from VUS in EGFR gene. Patient is still interested in undergoing bilateral breast reduction. She denies any significant interval changes or any changes in family history.  She has no other questions or concerns today.      HPI:  Mavis Oliveira is a 29 y.o. female who presents on 10/16/2024 for evaluation of risk for breast cancer. Based on the Tyrer-Cuzick Breast Cancer Risk Model, her lifetime risk is calculated to be 25.9%.    Patient is doing well today. She does complain of bilateral intermittent nipple pain that has been going on for a couple of months. She describes this pain as a throbbing pain. She states the pain is worse when she takes her bra off in around the time of her menstrual cycles. She does not drink excessive caffeine. She does wear bras with underwire. She also complains of bilateral intermittent "cysts" that appear in her inframammary folds. Patient describes these cysts/lumps as painful. She has never had to have any of these cysts previously drained.  She has not experienced these cysts anywhere else in her body such as in her axilla or groin.  She has never been previously diagnosed with hidradenitis suppurativa. She currently denies any other breast issues including rashes, redness, swelling, nipple discharge, or new lumps/masses. She did recently undergo " bilateral breast ultrasound in September for further evaluation of breast pain in bilateral cysts in inframammary folds.  Ultrasound revealed no evidence of malignancy in either breast, however, there were inflamed sebaceous cyst noted in bilateral inframammary folds. There was no imaging correlate for bilateral intermittent nipple pain.  Patient has not undergone any other breast imaging previously.  Patient has not previously undergone any breast biopsies or breast surgeries.  She has not undergone any genetic testing.  Patient states she lives relatively healthy lifestyle. However, she is motivated to starting eating healthier. She states she has been walking couple of times per week for exercise. She does not smoke and she drinks alcohol on occasion. She currently works as a dispatcher for [employer].     Of note, patient does have a significant family history of breast cancer.  Her maternal grandmother was diagnosed with breast cancer at the age of 70.  Patient's maternal grandmother was genetically tested and was positive for BRCA 1 mutation.  Patient has a maternal aunt who was diagnosed with breast cancer at the age of 48.  Her maternal aunt was also positive for BRCA 1 gene mutation.  She has a maternal uncle who was diagnosed with throat and prostate cancer at unknown age.  She has 2 maternal great aunts who were diagnosed with breast cancer at unknown age.  She also has a maternal great grandmother who was diagnosed with breast cancer at unknown age.    Imagin2024 BL Breast US -  No sonographic evidence of malignancy in either breast. Left breast 4:00 9 cm from the nipple intradermal 1.8 cm inflamed sebaceous cyst corresponds to the area of current clinical concern. Right breast 7:00 4 cm from the nipple intradermal 1 cm sebaceous cyst corresponds to the area of previous clinical concern. No sonographic correlate for the patient's bilateral nipple throbbing pain. BI-RADS: 2 Benign      Pathology:  none     OB/GYN History:  Age at Menarche Onset: 13  Menopausal Status: premenopausal, LMP: Patient's last menstrual period was 04/02/2025 (exact date).  Hysterectomy/Oophorectomy: NA  Hormonal birth control (duration): 3 years   Pregnancy History: NA  Age at first live birth: NA  Hormone Replacement Therapy: No, none    Other:  MG breast density: No breast composition recorded.   Prior thoracic RT: none  Genetic testing: Negative aside from VUS in the EGFR gene c.938C>T ([id number]).    Ashkenazi Cheondoism descent: No    Family History:  Family History   Problem Relation Name Age of Onset    BRCA1 Negative Mother Sujata         BRCA1 analysis only, unable to retrieve report    Hyperlipidemia Mother Sujata     Hypertension Mother Sujata     Anxiety disorder Mother Sujata     Hypertension Maternal Grandmother O     Breast cancer Maternal Grandmother O 67        UL mastectomy    Alzheimer's disease Maternal Grandmother O     Cancer Maternal Grandmother O     Diabetes Maternal Grandmother O     BRCA1 Positive Maternal Aunt V         unable to retrieve a copy of report, can't confirm    Breast cancer Maternal Aunt V 48        BL mastectomy    Cancer Maternal Aunt V     Prostate cancer Other      Prostate cancer Other      Prostate cancer Other          recurrence    Colon cancer Other      Ovarian cancer Other      Breast cancer Other      Breast cancer Other      Breast cancer Other      Breast cancer Other      Breast cancer Other      Cancer Other          eye?    Ovarian cancer Maternal Aunt .     Asthma Maternal Cousin B         Patient History:  Past Medical History:   Diagnosis Date    Anemia     Genetic testing 11/11/2024    [employer] Multi Cancer Panel (70 genes) - NEGATIVE, VUS in EGFR    Obesity, unspecified     Scoliosis     Seborrheic dermatitis        Past Surgical History:   Procedure Laterality Date    SPINAL FUSION      Lumbar 2/2 scoliosis    WRIST SURGERY Right        Social  History     Socioeconomic History    Marital status: Single   Tobacco Use    Smoking status: Never     Passive exposure: Never    Smokeless tobacco: Never   Substance and Sexual Activity    Alcohol use: Yes     Comment: Occasional wine/shots of liquor    Drug use: Never    Sexual activity: Yes     Partners: Male     Birth control/protection: Condom       Immunization History   Administered Date(s) Administered    DTaP 1998, 1998, 1998, 10/01/2001    HPV 9-Valent 2012, 2012, 2013    Hepatitis A, Pediatric/Adolescent, 2 Dose 2015    Hepatitis B, Pediatric/Adolescent 1997, 1997, 1998    IPV 1998, 10/01/2001    Influenza - Trivalent - Fluarix, Flulaval, Fluzone, Afluria - PF 2015    MMR 1998, 10/01/2001    Meningococcal Conjugate (MCV4P) 2012, 2013    OPV 1997, 1998    Pneumococcal Conjugate - 7 Valent 10/01/2001    Tdap 2012    Varicella 1998, 2012       Medications/Allergies:    Current Outpatient Medications:     ciclopirox (LOPROX) 0.77 % Crea, Apply topically 2 (two) times daily. for 14 days, Disp: 15 g, Rfl: 0    ferrous gluconate (FERGON) 324 MG tablet, TAKE 1 TABLET BY MOUTH EVERY OTHER DAY, Disp: 30 tablet, Rfl: 3    fluocinolone and shower cap 0.01 % Oil, SMARTSI Milliliter(s) Topical Every Night PRN, Disp: , Rfl:     hydrocortisone 2.5 % ointment, Apply topically 2 (two) times daily as needed., Disp: , Rfl:     ketoconazole (NIZORAL) 2 % shampoo, Apply topically., Disp: , Rfl:     fluconazole (DIFLUCAN) 150 MG Tab, Take 300 mg by mouth every 7 days. (Patient not taking: Reported on 2025), Disp: , Rfl:     gabapentin (NEURONTIN) 300 MG capsule, Take 1 capsule (300 mg total) by mouth once daily. for 14 days (Patient not taking: Reported on 2024), Disp: 14 capsule, Rfl: 0    ondansetron (ZOFRAN-ODT) 4 MG TbDL, Take 1 tablet (4 mg total) by mouth every 8 (eight) hours as needed  "(nausea/vomiting). (Patient not taking: Reported on 4/21/2025), Disp: 10 tablet, Rfl: 0     Review of patient's allergies indicates:   Allergen Reactions    Amoxil [amoxicillin] Itching and Rash       Review of Systems:  All pertinent history mentioned in HPI.     Objective:     Vitals:  Vitals:    04/21/25 0756   BP: 114/80   BP Location: Right arm   Patient Position: Sitting   Pulse: 77   Resp: 18   Temp: 98 °F (36.7 °C)   TempSrc: Oral   SpO2: 100%   Weight: 106 kg (233 lb 9.6 oz)   Height: 5' 6" (1.676 m)         Body mass index is 37.7 kg/m².     Physical Exam:  General: The patient is awake, alert and oriented times three. The patient is well nourished and in no acute distress.  Neck: There is no evidence of palpable cervical, supraclavicular or axillary adenopathy. The neck is supple. The thyroid is not enlarged.  Musculoskeletal: The patient has a normal range of motion of her bilateral upper extremities.  Chest: Examination of the chest wall fails to reveal any obvious abnormalities.  The lungs are clear to auscultation bilaterally without rales, rhonchi, or wheezing.  Cardiovascular: The heart has a regular rate and rhythm without murmurs, gallops or rubs.  Breast:   Right:   Examination of right breast fails to reveal any dominant masses or areas of significant focal nodularity. The nipple is everted without evidence of discharge. There is no skin dimpling with movement of the pectoralis. There are multiple scars noted in inframammary fold.  Left:   Examination of the left breast fails to reveal any dominant masses or areas of significant focal nodularity. The nipple is everted without evidence of discharge. There is no skin dimpling with movement of the pectoralis. There are multiple scars noted in inframammary fold.  Abdomen: The abdomen is soft, flat, nontender and nondistended with no palpable masses or organomegaly.  Integumentary: no rashes or skin lesions present.   Neurologic: cranial nerves " intact, no signs of peripheral neurological deficit, motor/sensory function intact    Assessment:     Patient Active Problem List   Diagnosis    Morbid obesity    Hyperlipidemia    Left ovarian cyst    At high risk for breast cancer    Sebaceous cyst of skin of both breasts    Iron deficiency    Viral upper respiratory tract infection    Acute cough        Mavis was seen today for follow-up.    Diagnoses and all orders for this visit:    At high risk for breast cancer    Macromastia    Family history of breast cancer        Plan:     1. Lifestyle - Healthy lifestyle guidelines were reviewed. She was encouraged to engage in regular exercise, maintain a healthy body weight, and avoid excessive alcohol consumption. Healthy nutritional guidelines were also discussed. Self-breast examination was reviewed with the patient in detail and she was encouraged to perform this on a monthly basis.    2. Surveillance - According to NCCN guidelines, patient should start undergoing high risk screening when she reaches the age of 40, which is 10 years younger than when her youngest known relative was diagnosed with breast cancer. According to ACR guidelines, patient can start undergoing screening at the age of 30 being that she is considered high risk. Until patient reaches age 30, I recommend clinical breast exams every 6-12 months. Will see patient back in 1 year.     3. Prevention - We had a brief discussion/education about indications for preventative mastectomy or chemoprevention.  These methods are not recommended to her at this time.    4. Genetics - Negative aside from VUS in the EGFR gene c.938C>T ([id number]).      5. Macromastia - Please refer patient to Dr. Saman Melton to further discuss bilateral breast reduction.    Other routine screening exams:   -  Recommend annual follow up with PCP.   -  Recommend annual follow up with GYN for pap smears/gynecologic exams and CBE.   -  GI: Recommend start colorectal cancer  screening at age 45 in average risk individuals. This can be done either with a sensitive test that looks for signs of cancer in a persons stool (a stool-based test), or with an exam that looks at the colon and rectum (a visual exam). Patient's at an increased risk for CRC (ex. Personal or family history of CRC, certain types of polyps, genetic disorders, IBD, or hx of abdominal radiation) should start screening prior to age 45.       All of her questions were answered. She was advised to call if she develops any questions or concerns.    Dot Nguyen PA-C     --------------------------------------------------------------------------------------------------------------    Total time on the date of the visit ranged from 30-39 mins ([id number]). Total time includes both face-to-face and non-face-to-face time personally spent by myself on the day of the visit.    Non-face-to-face time included:  _X_ preparing to see the patient such as reviewing the patient record  __ obtaining and reviewing separately obtained history  _X_ independently interpreting results  _X_ documenting clinical information in electronic health record.    Face-to-face time included:  _X_ performing an appropriate history and examination  _X_ communicating results to the patient  _X_ counseling and educating the patient  __ ordering appropriate medications  __ ordering appropriate tests  _X_ ordering appropriate procedures (including follow-up)  _X_ answering any questions the patient had    Total Time spent on date of visit: 35 minutes

## 2025-04-21 ENCOUNTER — OFFICE VISIT (OUTPATIENT)
Dept: SURGERY | Facility: CLINIC | Age: 30
End: 2025-04-21
Payer: COMMERCIAL

## 2025-04-21 VITALS
HEIGHT: 66 IN | TEMPERATURE: 98 F | WEIGHT: 233.63 LBS | SYSTOLIC BLOOD PRESSURE: 114 MMHG | BODY MASS INDEX: 37.55 KG/M2 | RESPIRATION RATE: 18 BRPM | OXYGEN SATURATION: 100 % | DIASTOLIC BLOOD PRESSURE: 80 MMHG | HEART RATE: 77 BPM

## 2025-04-21 DIAGNOSIS — Z91.89 AT HIGH RISK FOR BREAST CANCER: ICD-10-CM

## 2025-04-21 DIAGNOSIS — Z80.3 FAMILY HISTORY OF BREAST CANCER: ICD-10-CM

## 2025-04-21 DIAGNOSIS — N62 MACROMASTIA: Primary | ICD-10-CM

## 2025-04-21 PROCEDURE — 99214 OFFICE O/P EST MOD 30 MIN: CPT | Mod: S$GLB,,,

## 2025-04-21 PROCEDURE — 1159F MED LIST DOCD IN RCRD: CPT | Mod: CPTII,S$GLB,,

## 2025-04-21 PROCEDURE — 1160F RVW MEDS BY RX/DR IN RCRD: CPT | Mod: CPTII,S$GLB,,

## 2025-04-21 PROCEDURE — 3008F BODY MASS INDEX DOCD: CPT | Mod: CPTII,S$GLB,,

## 2025-04-21 PROCEDURE — 3074F SYST BP LT 130 MM HG: CPT | Mod: CPTII,S$GLB,,

## 2025-04-21 PROCEDURE — 3079F DIAST BP 80-89 MM HG: CPT | Mod: CPTII,S$GLB,,

## 2025-04-21 PROCEDURE — 99999 PR PBB SHADOW E&M-EST. PATIENT-LVL IV: CPT | Mod: PBBFAC,,,

## 2025-06-24 ENCOUNTER — PATIENT OUTREACH (OUTPATIENT)
Facility: CLINIC | Age: 30
End: 2025-06-24
Payer: COMMERCIAL

## 2025-06-24 NOTE — PROGRESS NOTES
Value base Outreach      No answer,left message for call back to discuss overdue Health maintenance Topics  .Portal message sent for AW follow up, I will follow again in 3 months if no appt.  Requesting Pap  Overdue Labs pending, call back..

## 2025-07-09 ENCOUNTER — TELEPHONE (OUTPATIENT)
Dept: FAMILY MEDICINE | Facility: CLINIC | Age: 30
End: 2025-07-09
Payer: COMMERCIAL

## 2025-07-09 DIAGNOSIS — Z12.4 CERVICAL CANCER SCREENING: Primary | ICD-10-CM
